# Patient Record
Sex: MALE | Race: WHITE | Employment: OTHER | URBAN - METROPOLITAN AREA
[De-identification: names, ages, dates, MRNs, and addresses within clinical notes are randomized per-mention and may not be internally consistent; named-entity substitution may affect disease eponyms.]

---

## 2021-08-03 ENCOUNTER — HOSPITAL ENCOUNTER (EMERGENCY)
Age: 86
Discharge: HOME | End: 2021-08-03
Attending: EMERGENCY MEDICINE
Payer: MEDICARE

## 2021-08-03 ENCOUNTER — APPOINTMENT (OUTPATIENT)
Dept: CT IMAGING | Age: 86
End: 2021-08-03
Payer: MEDICARE

## 2021-08-03 VITALS
OXYGEN SATURATION: 97 % | RESPIRATION RATE: 18 BRPM | SYSTOLIC BLOOD PRESSURE: 206 MMHG | WEIGHT: 155 LBS | TEMPERATURE: 97.9 F | HEART RATE: 82 BPM | DIASTOLIC BLOOD PRESSURE: 76 MMHG
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
## 2021-08-03 DIAGNOSIS — T14.8XXA MULTIPLE SKIN TEARS: ICD-10-CM

## 2021-08-03 DIAGNOSIS — S09.90XA CLOSED HEAD INJURY, INITIAL ENCOUNTER: ICD-10-CM

## 2021-08-03 DIAGNOSIS — S01.81XA FACIAL LACERATION, INITIAL ENCOUNTER: ICD-10-CM

## 2021-08-03 PROCEDURE — 72125 CT NECK SPINE W/O DYE: CPT

## 2021-08-03 PROCEDURE — 99284 EMERGENCY DEPT VISIT MOD MDM: CPT

## 2021-08-03 PROCEDURE — 12004 RPR S/N/AX/GEN/TRK7.6-12.5CM: CPT

## 2021-08-03 PROCEDURE — 70450 CT HEAD/BRAIN W/O DYE: CPT

## 2021-08-03 RX ORDER — ASPIRIN 81 MG/1
81 TABLET, CHEWABLE ORAL DAILY
COMMUNITY

## 2021-08-03 NOTE — ED NOTES
Skin abrasions on head, knee, and fingers cleaned with Hibiclens and saline. Pt tolerated well.       [facility]  08/03/21 1943

## 2021-08-03 NOTE — ED TRIAGE NOTES
Pt fell in kitchen. States hit head on floor. Hematoma and laceration to right forehead. Skin tears and wounds to right leg, right elbow, left hand. Denies LOC. Takes 81 mg ASA daily.

## 2021-08-04 PROBLEM — D64.9 ANEMIA: Status: ACTIVE | Noted: 2020-05-05

## 2021-08-04 PROBLEM — I50.9 CONGESTIVE HEART FAILURE (HCC): Status: ACTIVE | Noted: 2020-05-05

## 2021-08-04 PROBLEM — Z71.89 ADVANCE DIRECTIVE DISCUSSED WITH PATIENT: Status: ACTIVE | Noted: 2020-11-09

## 2021-08-04 PROBLEM — B02.9 HERPES ZOSTER: Status: ACTIVE | Noted: 2020-04-08

## 2021-08-04 PROBLEM — R73.01 IMPAIRED FASTING GLUCOSE: Status: ACTIVE | Noted: 2021-08-04

## 2021-08-04 PROBLEM — E78.5 HYPERLIPIDEMIA: Status: ACTIVE | Noted: 2021-08-04

## 2021-08-04 PROBLEM — F02.80 DEMENTIA DUE TO GENERAL MEDICAL CONDITION (HCC): Status: ACTIVE | Noted: 2021-08-04

## 2021-08-04 ASSESSMENT — ENCOUNTER SYMPTOMS
RHINORRHEA: 0
SHORTNESS OF BREATH: 0
ABDOMINAL PAIN: 0
COUGH: 0

## 2021-08-04 NOTE — ED PROVIDER NOTES
[address]      Pt Name: Ilia Rivera  MRN: [id number]  [place] 10/5/1926  Date of evaluation: 8/3/2021  Provider: Tu Silva MD    CHIEF COMPLAINT       Chief Complaint   Patient presents with    Fall    Laceration    Head Injury         HISTORY OF PRESENT ILLNESS   (Location/Symptom, Timing/Onset,Context/Setting, Quality, Duration, Modifying Factors, Severity)  Note limiting factors. Ilia Rivera is a 80 y.o. male who presents to the emergency department for a fall. The patient states that he had gone earlier in the day to receive his shots in his eyes for his macular degeneration. This evening he was walking to go to the refrigerator to get a drink he was not using his walker and he was having trouble seeing. As he reached for the refrigerator the. That he became unstable and fell. He tried grabbing onto something as he fell he landed hitting his head on the hardwood floor. He immediately called out to his son for help who came and found him laying on the floor. The patient denies any pain at this moment. He has multiple wounds. Nursing notes were reviewed. REVIEW OF SYSTEMS    (2-9 systems for level 4, 10 or more for level 5)     Review of Systems   Constitutional: Negative for fever. HENT: Negative for rhinorrhea. Eyes: Positive for visual disturbance. Respiratory: Negative for cough and shortness of breath. Cardiovascular: Negative for chest pain. Gastrointestinal: Negative for abdominal pain. Genitourinary: Negative for difficulty urinating. Musculoskeletal: Negative for neck pain. Neurological: Negative for headaches.          PAST MEDICAL HISTORY     Past Medical History:   Diagnosis Date    Macular degeneration     Seizures ([facility] [address]          SURGICALHISTORY       Past Surgical History:   Procedure Laterality Date    JOINT REPLACEMENT           CURRENT MEDICATIONS       Discharge Medication List as of 8/3/2021  8:33 PM      CONTINUE these medications which have NOT CHANGED    Details   aspirin 81 MG chewable tablet Take 81 mg by mouth dailyHistorical Med             ALLERGIES     Patient has no known allergies. FAMILY HISTORY     No family history on file. SOCIAL HISTORY       Social History     Socioeconomic History    Marital status:      Spouse name: Not on file    Number of children: Not on file    Years of education: Not on file    Highest education level: Not on file   Occupational History    Not on file   Tobacco Use    Smoking status: Unknown If Ever Smoked   Substance and Sexual Activity    Alcohol use: Yes    Drug use: Not on file    Sexual activity: Not on file   Other Topics Concern    Not on file   Social History Narrative    Not on file     Social Determinants of Health     Financial Resource Strain:     Difficulty of Paying Living Expenses:    Food Insecurity:     Worried About Running Out of Food in the Last Year:     [address] in the Last Year:    Transportation Needs:     Lack of Transportation (Medical):      Lack of Transportation (Non-Medical):    Physical Activity:     Days of Exercise per Week:     Minutes of Exercise per Session:    Stress:     Feeling of Stress :    Social Connections:     Frequency of Communication with Friends and Family:     Frequency of Social Gatherings with Friends and Family:     Attends Orthodox Services:     Active Member of Clubs or Organizations:     Attends Club or Organization Meetings:     Marital Status:    Intimate Partner Violence:     Fear of Current or Ex-Partner:     Emotionally Abused:     Physically Abused:     Sexually Abused:        SCREENINGS    Georgi Coma Scale  Eye Opening: Spontaneous  Best Verbal Response: Oriented  Best Motor Response: Obeys commands  Georgi Coma Scale Score: 15        PHYSICAL EXAM    (up to 7 for level 4, 8 or more for level 5)     ED Triage Vitals [08/03/21 1837]   BP Temp Temp Source Pulse Resp SpO2 Height Weight   (!) 193/68 97.9 °F (36.6 °C) Oral 57 16 100 % -- 155 lb (70.3 kg)       Physical Exam  Vitals and nursing note reviewed. Constitutional:       Appearance: Normal appearance. He is well-developed. He is not ill-appearing. HENT:      Head: Normocephalic and atraumatic. Right Ear: External ear normal.      Left Ear: External ear normal.      Nose: Nose normal.   Eyes:      General: No scleral icterus. Right eye: No discharge. Left eye: No discharge. Comments: Excessive tearing of bilateral eyes   Cardiovascular:      Rate and Rhythm: Normal rate and regular rhythm. Pulmonary:      Effort: Pulmonary effort is normal. No respiratory distress. Abdominal:      General: Bowel sounds are normal. There is no distension. Palpations: Abdomen is soft. Tenderness: There is no abdominal tenderness. Musculoskeletal:         General: No tenderness or deformity. Cervical back: Neck supple. No tenderness. Skin:     Coloration: Skin is not pale. Comments: There is an approximately 1-1/2 cm laceration over the right eyebrow with surrounding hematoma. There is also a skin tear in the lateral aspect of the right elbow measuring approximately 2-1/2 cm. There is a larger skin tear measuring approximately 6-1/2 x 5 cm on the right proximal leg and there are tears of the 3rd and 4th fingers on the left hand on the palmar aspect. Neurological:      Mental Status: He is alert.    Psychiatric:         Mood and Affect: Mood normal.         Behavior: Behavior normal.             DIAGNOSTIC RESULTS     EKG: All EKG's are interpreted by the Emergency Department Physician who either signs or Co-signs this chart in the absence of a cardiologist.    12 lead EKG shows     RADIOLOGY:   Non-plain film images such as CT, Ultrasound and MRI are read by the radiologist. Plain radiographic images are visualized and preliminarily interpreted by the emergency physician with the below findings:        Interpretation per the Radiologist below, if available at the time of this note:    CT CERVICAL SPINE WO CONTRAST   Final Result      1. No acute intracranial process. 2.  Chronic bilateral basal ganglia lacunar infarcts. 3.  No evidence of acute fracture in the cervical spine. Moderate cervical spondylosis. CT HEAD WO CONTRAST   Final Result      1. No acute intracranial process. 2.  Chronic bilateral basal ganglia lacunar infarcts. 3.  No evidence of acute fracture in the cervical spine. Moderate cervical spondylosis. ED BEDSIDE ULTRASOUND:   Performed by ED Physician - none    LABS:  Labs Reviewed - No data to display    All other labs were within normal range or not returned as of this dictation. EMERGENCY DEPARTMENT COURSE and DIFFERENTIAL DIAGNOSIS/MDM:   Vitals:    Vitals:    08/03/21 1837 08/03/21 2013   BP: (!) 193/68 (!) 206/76   Pulse: 57 82   Resp: 16 18   Temp: 97.9 °F (36.6 °C)    TempSrc: Oral    SpO2: 100% 97%   Weight: 155 lb (70.3 kg)        Elderly male who comes in after mechanical fall. A CT head and CT cervical spine obtained. Patient is not having any pain at present. Wound care is performed and the wounds are cleaned with hexedine solution. Laceration Repair Procedure Note    Indication: Multiple skin injuries as documented in physical exam    Procedure: The wounds are cleaned and irrigated with chlorhexidine solution and normal saline. On the right side of the face Steri-Strip was used for approximation. On the right elbow a combination of Dermabond and Steri-Strips were used. On the right leg Steri-Strips were used for approximation and on the left hand Steri-Strips and Dermabond were used for approximation. No active bleeding no complications the patient tolerates the procedure well. The wounds are covered in a nonadherent sterile dressing. No foreign body were identified.   Total repaired wound length 11cm    Instructions given on wound care. Follow-up in the primary care setting recommended. Return instructions discussed with the patient and his son. They expressed understanding and they are agreeable. The patient is able to ambulate here in the emergency room with minimal assistance which is at his baseline. CRITICAL CARE TIME   None       CONSULTS:  None    PROCEDURES:       Procedures    FINAL IMPRESSION      1. Fall, initial encounter    2. Closed head injury, initial encounter    3. Multiple skin tears    4. Facial laceration, initial encounter          DISPOSITION/PLAN   DISPOSITION Decision To Discharge 08/03/2021 08:08:26 PM      PATIENT REFERREDTO:  No follow-up provider specified.     DISCHARGEMEDICATIONS:  Discharge Medication List as of 8/3/2021  8:33 PM             (Please note that portions of this note were completed with a voice recognition program.  Efforts were made to edit the dictations but occasionally words are mis-transcribed.)    Sarah Sahni MD (electronically signed)  Attending Emergency Physician       Sarah Sahni MD  08/04/21 4424

## 2021-08-04 NOTE — ED NOTES
Placed non adherent gauze and stretch gauze on elbow and knee skin tear. CMS intact pre and post wrapping. Pt. Tolerated well.       Perfecto Laureano RCP  08/03/21 2021

## 2023-07-27 ENCOUNTER — HOSPITAL ENCOUNTER (INPATIENT)
Age: 88
LOS: 5 days | Discharge: NURSING, REHAB OR LONG-TERM CARE | DRG: 872 | End: 2023-08-01
Attending: EMERGENCY MEDICINE | Admitting: STUDENT IN AN ORGANIZED HEALTH CARE EDUCATION/TRAINING PROGRAM
Payer: MEDICARE

## 2023-07-27 DIAGNOSIS — L03.116 CELLULITIS OF LEFT LOWER EXTREMITY: Primary | ICD-10-CM

## 2023-07-27 PROBLEM — L03.90 CELLULITIS: Status: ACTIVE | Noted: 2023-07-27

## 2023-07-27 PROBLEM — L03.90 CELLULITIS: Status: RESOLVED | Noted: 2023-07-27 | Resolved: 2023-07-27

## 2023-07-27 PROBLEM — N18.4 CKD (CHRONIC KIDNEY DISEASE) STAGE 4, GFR 15-29 ML/MIN (HCC): Status: ACTIVE | Noted: 2023-07-27

## 2023-07-27 LAB
ALBUMIN SERPL-MCNC: 3.4 G/DL (ref 3.4–5)
ALBUMIN/GLOB SERPL: 1.2 {RATIO} (ref 1.1–2.2)
ALP SERPL-CCNC: 172 U/L (ref 40–129)
ALT SERPL-CCNC: 16 U/L (ref 10–40)
ANION GAP SERPL CALCULATED.3IONS-SCNC: 12 MMOL/L (ref 3–16)
AST SERPL-CCNC: 34 U/L (ref 15–37)
BASOPHILS # BLD: 0 K/UL (ref 0–0.2)
BASOPHILS NFR BLD: 0.1 %
BILIRUB SERPL-MCNC: 0.7 MG/DL (ref 0–1)
BUN SERPL-MCNC: 59 MG/DL (ref 7–20)
CALCIUM SERPL-MCNC: 8.3 MG/DL (ref 8.3–10.6)
CHLORIDE SERPL-SCNC: 101 MMOL/L (ref 99–110)
CO2 SERPL-SCNC: 24 MMOL/L (ref 21–32)
CREAT SERPL-MCNC: 1.7 MG/DL (ref 0.8–1.3)
D DIMER: 1.92 UG/ML FEU (ref 0–0.6)
DEPRECATED RDW RBC AUTO: 15.2 % (ref 12.4–15.4)
EOSINOPHIL # BLD: 0 K/UL (ref 0–0.6)
EOSINOPHIL NFR BLD: 0 %
GFR SERPLBLD CREATININE-BSD FMLA CKD-EPI: 36 ML/MIN/{1.73_M2}
GLUCOSE SERPL-MCNC: 167 MG/DL (ref 70–99)
HCT VFR BLD AUTO: 30.5 % (ref 40.5–52.5)
HGB BLD-MCNC: 9.9 G/DL (ref 13.5–17.5)
LACTATE BLDV-SCNC: 1.3 MMOL/L (ref 0.4–2)
LYMPHOCYTES # BLD: 3.6 K/UL (ref 1–5.1)
LYMPHOCYTES NFR BLD: 28.2 %
MCH RBC QN AUTO: 28.6 PG (ref 26–34)
MCHC RBC AUTO-ENTMCNC: 32.4 G/DL (ref 31–36)
MCV RBC AUTO: 88.2 FL (ref 80–100)
MONOCYTES # BLD: 0.8 K/UL (ref 0–1.3)
MONOCYTES NFR BLD: 6.2 %
NEUTROPHILS # BLD: 8.3 K/UL (ref 1.7–7.7)
NEUTROPHILS NFR BLD: 65.5 %
PLATELET # BLD AUTO: 220 K/UL (ref 135–450)
PMV BLD AUTO: 8.5 FL (ref 5–10.5)
POTASSIUM SERPL-SCNC: 5.1 MMOL/L (ref 3.5–5.1)
PROT SERPL-MCNC: 6.3 G/DL (ref 6.4–8.2)
RBC # BLD AUTO: 3.45 M/UL (ref 4.2–5.9)
SODIUM SERPL-SCNC: 137 MMOL/L (ref 136–145)
WBC # BLD AUTO: 12.6 K/UL (ref 4–11)

## 2023-07-27 PROCEDURE — 87040 BLOOD CULTURE FOR BACTERIA: CPT

## 2023-07-27 PROCEDURE — 80053 COMPREHEN METABOLIC PANEL: CPT

## 2023-07-27 PROCEDURE — 6370000000 HC RX 637 (ALT 250 FOR IP): Performed by: EMERGENCY MEDICINE

## 2023-07-27 PROCEDURE — 6360000002 HC RX W HCPCS: Performed by: EMERGENCY MEDICINE

## 2023-07-27 PROCEDURE — 2580000003 HC RX 258: Performed by: INTERNAL MEDICINE

## 2023-07-27 PROCEDURE — 1200000000 HC SEMI PRIVATE

## 2023-07-27 PROCEDURE — 85379 FIBRIN DEGRADATION QUANT: CPT

## 2023-07-27 PROCEDURE — 99285 EMERGENCY DEPT VISIT HI MDM: CPT

## 2023-07-27 PROCEDURE — 96365 THER/PROPH/DIAG IV INF INIT: CPT

## 2023-07-27 PROCEDURE — 83605 ASSAY OF LACTIC ACID: CPT

## 2023-07-27 PROCEDURE — 85025 COMPLETE CBC W/AUTO DIFF WBC: CPT

## 2023-07-27 PROCEDURE — 6370000000 HC RX 637 (ALT 250 FOR IP): Performed by: INTERNAL MEDICINE

## 2023-07-27 PROCEDURE — 2580000003 HC RX 258: Performed by: EMERGENCY MEDICINE

## 2023-07-27 RX ORDER — SODIUM CHLORIDE 9 MG/ML
INJECTION, SOLUTION INTRAVENOUS PRN
Status: DISCONTINUED | OUTPATIENT
Start: 2023-07-27 | End: 2023-08-01 | Stop reason: HOSPADM

## 2023-07-27 RX ORDER — ACETAMINOPHEN 650 MG/1
650 SUPPOSITORY RECTAL EVERY 6 HOURS PRN
Status: DISCONTINUED | OUTPATIENT
Start: 2023-07-27 | End: 2023-08-01 | Stop reason: HOSPADM

## 2023-07-27 RX ORDER — TORSEMIDE 20 MG/1
1 TABLET ORAL DAILY
Status: ON HOLD | COMMUNITY
End: 2023-08-01 | Stop reason: HOSPADM

## 2023-07-27 RX ORDER — LATANOPROST 50 UG/ML
1 SOLUTION/ DROPS OPHTHALMIC NIGHTLY
Status: DISCONTINUED | OUTPATIENT
Start: 2023-07-27 | End: 2023-08-01 | Stop reason: HOSPADM

## 2023-07-27 RX ORDER — TORSEMIDE 20 MG/1
20 TABLET ORAL DAILY
Status: DISCONTINUED | OUTPATIENT
Start: 2023-07-28 | End: 2023-07-27

## 2023-07-27 RX ORDER — FUROSEMIDE 40 MG/1
40 TABLET ORAL 2 TIMES DAILY
Status: ON HOLD | COMMUNITY
Start: 2023-07-21 | End: 2023-08-01 | Stop reason: HOSPADM

## 2023-07-27 RX ORDER — DOXYCYCLINE 100 MG/1
100 CAPSULE ORAL ONCE
Status: COMPLETED | OUTPATIENT
Start: 2023-07-27 | End: 2023-07-27

## 2023-07-27 RX ORDER — ASPIRIN 81 MG/1
81 TABLET, CHEWABLE ORAL DAILY
Status: DISCONTINUED | OUTPATIENT
Start: 2023-07-28 | End: 2023-08-01 | Stop reason: HOSPADM

## 2023-07-27 RX ORDER — ENOXAPARIN SODIUM 100 MG/ML
30 INJECTION SUBCUTANEOUS DAILY
Status: DISCONTINUED | OUTPATIENT
Start: 2023-07-28 | End: 2023-07-28

## 2023-07-27 RX ORDER — ONDANSETRON 4 MG/1
4 TABLET, ORALLY DISINTEGRATING ORAL EVERY 8 HOURS PRN
Status: DISCONTINUED | OUTPATIENT
Start: 2023-07-27 | End: 2023-08-01 | Stop reason: HOSPADM

## 2023-07-27 RX ORDER — SODIUM CHLORIDE 0.9 % (FLUSH) 0.9 %
5-40 SYRINGE (ML) INJECTION EVERY 12 HOURS SCHEDULED
Status: DISCONTINUED | OUTPATIENT
Start: 2023-07-27 | End: 2023-08-01 | Stop reason: HOSPADM

## 2023-07-27 RX ORDER — DORZOLAMIDE HCL 20 MG/ML
1 SOLUTION/ DROPS OPHTHALMIC EVERY 12 HOURS
COMMUNITY
Start: 2018-12-17

## 2023-07-27 RX ORDER — LATANOPROST 50 UG/ML
SOLUTION/ DROPS OPHTHALMIC
COMMUNITY
Start: 2021-03-18

## 2023-07-27 RX ORDER — ONDANSETRON 2 MG/ML
4 INJECTION INTRAMUSCULAR; INTRAVENOUS EVERY 6 HOURS PRN
Status: DISCONTINUED | OUTPATIENT
Start: 2023-07-27 | End: 2023-08-01 | Stop reason: HOSPADM

## 2023-07-27 RX ORDER — LEVETIRACETAM 500 MG/1
TABLET ORAL
COMMUNITY
Start: 2023-07-16

## 2023-07-27 RX ORDER — GABAPENTIN 100 MG/1
100 CAPSULE ORAL 3 TIMES DAILY
Status: DISCONTINUED | OUTPATIENT
Start: 2023-07-27 | End: 2023-07-27

## 2023-07-27 RX ORDER — LISINOPRIL 5 MG/1
5 TABLET ORAL 2 TIMES DAILY
COMMUNITY
Start: 2023-07-19

## 2023-07-27 RX ORDER — POLYETHYLENE GLYCOL 3350 17 G/17G
17 POWDER, FOR SOLUTION ORAL DAILY PRN
Status: DISCONTINUED | OUTPATIENT
Start: 2023-07-27 | End: 2023-08-01 | Stop reason: HOSPADM

## 2023-07-27 RX ORDER — LISINOPRIL 5 MG/1
5 TABLET ORAL 2 TIMES DAILY
Status: DISCONTINUED | OUTPATIENT
Start: 2023-07-27 | End: 2023-08-01 | Stop reason: HOSPADM

## 2023-07-27 RX ORDER — GABAPENTIN 100 MG/1
1 CAPSULE ORAL 3 TIMES DAILY
Status: ON HOLD | COMMUNITY
End: 2023-08-01 | Stop reason: HOSPADM

## 2023-07-27 RX ORDER — LEVETIRACETAM 500 MG/1
250 TABLET ORAL 2 TIMES DAILY
Status: DISCONTINUED | OUTPATIENT
Start: 2023-07-27 | End: 2023-08-01 | Stop reason: HOSPADM

## 2023-07-27 RX ORDER — SODIUM CHLORIDE 0.9 % (FLUSH) 0.9 %
5-40 SYRINGE (ML) INJECTION PRN
Status: DISCONTINUED | OUTPATIENT
Start: 2023-07-27 | End: 2023-08-01 | Stop reason: HOSPADM

## 2023-07-27 RX ORDER — ACETAMINOPHEN 325 MG/1
650 TABLET ORAL EVERY 6 HOURS PRN
Status: DISCONTINUED | OUTPATIENT
Start: 2023-07-27 | End: 2023-08-01 | Stop reason: HOSPADM

## 2023-07-27 RX ADMIN — PIPERACILLIN AND TAZOBACTAM 3375 MG: 3; .375 INJECTION, POWDER, LYOPHILIZED, FOR SOLUTION INTRAVENOUS at 20:00

## 2023-07-27 RX ADMIN — LISINOPRIL 5 MG: 5 TABLET ORAL at 23:43

## 2023-07-27 RX ADMIN — DOXYCYCLINE 100 MG: 100 CAPSULE ORAL at 19:12

## 2023-07-27 RX ADMIN — LEVETIRACETAM 250 MG: 500 TABLET, FILM COATED ORAL at 23:42

## 2023-07-27 RX ADMIN — SODIUM CHLORIDE, PRESERVATIVE FREE 10 ML: 5 INJECTION INTRAVENOUS at 23:42

## 2023-07-27 ASSESSMENT — PAIN SCALES - GENERAL
PAINLEVEL_OUTOF10: 0
PAINLEVEL_OUTOF10: 0

## 2023-07-27 ASSESSMENT — ENCOUNTER SYMPTOMS
TROUBLE SWALLOWING: 0
VOICE CHANGE: 0
SHORTNESS OF BREATH: 0
WHEEZING: 0

## 2023-07-27 ASSESSMENT — PAIN - FUNCTIONAL ASSESSMENT: PAIN_FUNCTIONAL_ASSESSMENT: NONE - DENIES PAIN

## 2023-07-27 NOTE — PLAN OF CARE
[facility] Hospitalist Transfer accept note  Case reviewed with ER physician Dr Rachelle Amador    80year-old male with history of Parkinson's, seizure, HTN, CKD stage IV(baseline creatinine high 1, although has been in the twos in the past) who presented from nursing home with 2-day history of left lower extremity redness swelling pain. In the ER vitals were stable, labs showed baseline creatinine, leukocytosis 12.6, anemia of 9.9 at baseline. Blood cultures were obtained, due to his lower extremity swelling D-dimer was obtained. Patient will be admitted to [address] [place] [address] telemetry with need for IV antibiotics, patient was started on Zosyn, doxycycline in the ER will need MRSA swab, possible lower extremity Dopplers. PCP:  Nick Hinton MD      Admitting orders already given to Transfer center     Please notify MD once the patient arrives.     Electronically signed by Kem Marquez MD on 7/27/23 at 6:32 PM EDT  [address] \[facility]\"" hospitalist.

## 2023-07-27 NOTE — ED NOTES
Patient to transfer to room # [phone] report to [facility] patient and family updated, eta 40 mins     Sallee Essex, RN  07/27/23 5161

## 2023-07-28 ENCOUNTER — APPOINTMENT (OUTPATIENT)
Dept: CT IMAGING | Age: 88
DRG: 872 | End: 2023-07-28
Payer: MEDICARE

## 2023-07-28 ENCOUNTER — APPOINTMENT (OUTPATIENT)
Dept: VASCULAR LAB | Age: 88
DRG: 872 | End: 2023-07-28
Payer: MEDICARE

## 2023-07-28 LAB
25(OH)D3 SERPL-MCNC: 30.8 NG/ML
ANION GAP SERPL CALCULATED.3IONS-SCNC: 11 MMOL/L (ref 3–16)
ANTI-XA UNFRAC HEPARIN: 0.15 IU/ML (ref 0.3–0.7)
ANTI-XA UNFRAC HEPARIN: <0.1 IU/ML (ref 0.3–0.7)
APTT BLD: 121.5 SEC (ref 22.7–35.9)
BACTERIA URNS QL MICRO: ABNORMAL /HPF
BASOPHILS # BLD: 0 K/UL (ref 0–0.2)
BASOPHILS NFR BLD: 0.2 %
BILIRUB UR QL STRIP.AUTO: NEGATIVE
BUN SERPL-MCNC: 65 MG/DL (ref 7–20)
CALCIUM SERPL-MCNC: 7.7 MG/DL (ref 8.3–10.6)
CHLORIDE SERPL-SCNC: 106 MMOL/L (ref 99–110)
CLARITY UR: CLEAR
CO2 SERPL-SCNC: 23 MMOL/L (ref 21–32)
COLOR UR: YELLOW
CREAT SERPL-MCNC: 2.1 MG/DL (ref 0.8–1.3)
CRP SERPL-MCNC: 141.5 MG/L (ref 0–5.1)
CRYSTALS URNS MICRO: ABNORMAL /HPF
DEPRECATED RDW RBC AUTO: 15.5 % (ref 12.4–15.4)
EOSINOPHIL # BLD: 0 K/UL (ref 0–0.6)
EOSINOPHIL NFR BLD: 0.1 %
EPI CELLS #/AREA URNS HPF: ABNORMAL /HPF (ref 0–5)
GFR SERPLBLD CREATININE-BSD FMLA CKD-EPI: 28 ML/MIN/{1.73_M2}
GLUCOSE SERPL-MCNC: 162 MG/DL (ref 70–99)
GLUCOSE UR STRIP.AUTO-MCNC: NEGATIVE MG/DL
HCT VFR BLD AUTO: 28.4 % (ref 40.5–52.5)
HGB BLD-MCNC: 9.2 G/DL (ref 13.5–17.5)
HGB UR QL STRIP.AUTO: ABNORMAL
INR PPP: 1.55 (ref 0.84–1.16)
IRON SATN MFR SERPL: 6 % (ref 20–50)
IRON SERPL-MCNC: 12 UG/DL (ref 59–158)
KETONES UR STRIP.AUTO-MCNC: NEGATIVE MG/DL
LEUKOCYTE ESTERASE UR QL STRIP.AUTO: ABNORMAL
LYMPHOCYTES # BLD: 3.4 K/UL (ref 1–5.1)
LYMPHOCYTES NFR BLD: 30 %
MCH RBC QN AUTO: 28.7 PG (ref 26–34)
MCHC RBC AUTO-ENTMCNC: 32.3 G/DL (ref 31–36)
MCV RBC AUTO: 88.8 FL (ref 80–100)
MONOCYTES # BLD: 0.7 K/UL (ref 0–1.3)
MONOCYTES NFR BLD: 6.4 %
NEUTROPHILS # BLD: 7.2 K/UL (ref 1.7–7.7)
NEUTROPHILS NFR BLD: 63.3 %
NITRITE UR QL STRIP.AUTO: POSITIVE
PH UR STRIP.AUTO: 6 [PH] (ref 5–8)
PLATELET # BLD AUTO: 206 K/UL (ref 135–450)
PMV BLD AUTO: 8.8 FL (ref 5–10.5)
POTASSIUM SERPL-SCNC: 4.9 MMOL/L (ref 3.5–5.1)
PROT UR STRIP.AUTO-MCNC: 100 MG/DL
PROTHROMBIN TIME: 18.5 SEC (ref 11.5–14.8)
RBC # BLD AUTO: 3.2 M/UL (ref 4.2–5.9)
RBC #/AREA URNS HPF: ABNORMAL /HPF (ref 0–4)
SODIUM SERPL-SCNC: 140 MMOL/L (ref 136–145)
SP GR UR STRIP.AUTO: 1.02 (ref 1–1.03)
TIBC SERPL-MCNC: 210 UG/DL (ref 260–445)
UA COMPLETE W REFLEX CULTURE PNL UR: YES
UA DIPSTICK W REFLEX MICRO PNL UR: YES
URN SPEC COLLECT METH UR: ABNORMAL
UROBILINOGEN UR STRIP-ACNC: 0.2 E.U./DL
VIT B12 SERPL-MCNC: 1953 PG/ML (ref 211–911)
WBC # BLD AUTO: 11.4 K/UL (ref 4–11)
WBC #/AREA URNS HPF: >100 /HPF (ref 0–5)

## 2023-07-28 PROCEDURE — 6370000000 HC RX 637 (ALT 250 FOR IP): Performed by: INTERNAL MEDICINE

## 2023-07-28 PROCEDURE — 85520 HEPARIN ASSAY: CPT

## 2023-07-28 PROCEDURE — 86140 C-REACTIVE PROTEIN: CPT

## 2023-07-28 PROCEDURE — 85025 COMPLETE CBC W/AUTO DIFF WBC: CPT

## 2023-07-28 PROCEDURE — 85652 RBC SED RATE AUTOMATED: CPT

## 2023-07-28 PROCEDURE — 1200000000 HC SEMI PRIVATE

## 2023-07-28 PROCEDURE — 36415 COLL VENOUS BLD VENIPUNCTURE: CPT

## 2023-07-28 PROCEDURE — 82607 VITAMIN B-12: CPT

## 2023-07-28 PROCEDURE — 6360000002 HC RX W HCPCS: Performed by: INTERNAL MEDICINE

## 2023-07-28 PROCEDURE — 84134 ASSAY OF PREALBUMIN: CPT

## 2023-07-28 PROCEDURE — 97530 THERAPEUTIC ACTIVITIES: CPT

## 2023-07-28 PROCEDURE — 97166 OT EVAL MOD COMPLEX 45 MIN: CPT

## 2023-07-28 PROCEDURE — 85730 THROMBOPLASTIN TIME PARTIAL: CPT

## 2023-07-28 PROCEDURE — 87077 CULTURE AEROBIC IDENTIFY: CPT

## 2023-07-28 PROCEDURE — 87086 URINE CULTURE/COLONY COUNT: CPT

## 2023-07-28 PROCEDURE — 85610 PROTHROMBIN TIME: CPT

## 2023-07-28 PROCEDURE — 93971 EXTREMITY STUDY: CPT

## 2023-07-28 PROCEDURE — 82306 VITAMIN D 25 HYDROXY: CPT

## 2023-07-28 PROCEDURE — 2580000003 HC RX 258: Performed by: INTERNAL MEDICINE

## 2023-07-28 PROCEDURE — 87186 SC STD MICRODIL/AGAR DIL: CPT

## 2023-07-28 PROCEDURE — 97162 PT EVAL MOD COMPLEX 30 MIN: CPT

## 2023-07-28 PROCEDURE — 83540 ASSAY OF IRON: CPT

## 2023-07-28 PROCEDURE — 73700 CT LOWER EXTREMITY W/O DYE: CPT

## 2023-07-28 PROCEDURE — 83550 IRON BINDING TEST: CPT

## 2023-07-28 PROCEDURE — 80048 BASIC METABOLIC PNL TOTAL CA: CPT

## 2023-07-28 PROCEDURE — 81001 URINALYSIS AUTO W/SCOPE: CPT

## 2023-07-28 RX ORDER — THIAMINE HYDROCHLORIDE 100 MG/ML
100 INJECTION, SOLUTION INTRAMUSCULAR; INTRAVENOUS DAILY
Status: DISCONTINUED | OUTPATIENT
Start: 2023-07-28 | End: 2023-07-30

## 2023-07-28 RX ORDER — HEPARIN SODIUM 1000 [USP'U]/ML
80 INJECTION, SOLUTION INTRAVENOUS; SUBCUTANEOUS ONCE
Status: COMPLETED | OUTPATIENT
Start: 2023-07-28 | End: 2023-07-28

## 2023-07-28 RX ORDER — HEPARIN SODIUM 1000 [USP'U]/ML
40 INJECTION, SOLUTION INTRAVENOUS; SUBCUTANEOUS PRN
Status: DISCONTINUED | OUTPATIENT
Start: 2023-07-28 | End: 2023-07-30

## 2023-07-28 RX ORDER — HEPARIN SODIUM 10000 [USP'U]/100ML
5-30 INJECTION, SOLUTION INTRAVENOUS CONTINUOUS
Status: DISCONTINUED | OUTPATIENT
Start: 2023-07-28 | End: 2023-07-30

## 2023-07-28 RX ORDER — LANOLIN ALCOHOL/MO/W.PET/CERES
3 CREAM (GRAM) TOPICAL NIGHTLY
Status: DISCONTINUED | OUTPATIENT
Start: 2023-07-28 | End: 2023-08-01 | Stop reason: HOSPADM

## 2023-07-28 RX ORDER — HEPARIN SODIUM 1000 [USP'U]/ML
80 INJECTION, SOLUTION INTRAVENOUS; SUBCUTANEOUS PRN
Status: DISCONTINUED | OUTPATIENT
Start: 2023-07-28 | End: 2023-07-30

## 2023-07-28 RX ADMIN — HEPARIN SODIUM 24 UNITS/KG/HR: 10000 INJECTION, SOLUTION INTRAVENOUS at 22:14

## 2023-07-28 RX ADMIN — Medication 3 MG: at 20:39

## 2023-07-28 RX ADMIN — CEFAZOLIN 1000 MG: 1 INJECTION, POWDER, FOR SOLUTION INTRAMUSCULAR; INTRAVENOUS at 09:46

## 2023-07-28 RX ADMIN — HEPARIN SODIUM 5550 UNITS: 1000 INJECTION INTRAVENOUS; SUBCUTANEOUS at 03:37

## 2023-07-28 RX ADMIN — LISINOPRIL 5 MG: 5 TABLET ORAL at 09:25

## 2023-07-28 RX ADMIN — CEFAZOLIN 1000 MG: 1 INJECTION, POWDER, FOR SOLUTION INTRAMUSCULAR; INTRAVENOUS at 00:11

## 2023-07-28 RX ADMIN — LATANOPROST 1 DROP: 50 SOLUTION OPHTHALMIC at 00:10

## 2023-07-28 RX ADMIN — HEPARIN SODIUM 18 UNITS/KG/HR: 10000 INJECTION, SOLUTION INTRAVENOUS at 03:43

## 2023-07-28 RX ADMIN — ASPIRIN 81 MG 81 MG: 81 TABLET ORAL at 09:25

## 2023-07-28 RX ADMIN — THIAMINE HYDROCHLORIDE 100 MG: 100 INJECTION, SOLUTION INTRAMUSCULAR; INTRAVENOUS at 18:22

## 2023-07-28 RX ADMIN — LEVETIRACETAM 250 MG: 500 TABLET, FILM COATED ORAL at 20:39

## 2023-07-28 RX ADMIN — LEVETIRACETAM 250 MG: 500 TABLET, FILM COATED ORAL at 09:25

## 2023-07-28 RX ADMIN — HEPARIN SODIUM 5550 UNITS: 1000 INJECTION INTRAVENOUS; SUBCUTANEOUS at 09:28

## 2023-07-28 RX ADMIN — VANCOMYCIN HYDROCHLORIDE 1750 MG: 10 INJECTION, POWDER, LYOPHILIZED, FOR SOLUTION INTRAVENOUS at 18:39

## 2023-07-28 RX ADMIN — HEPARIN SODIUM 2780 UNITS: 1000 INJECTION INTRAVENOUS; SUBCUTANEOUS at 18:11

## 2023-07-28 RX ADMIN — SODIUM CHLORIDE, PRESERVATIVE FREE 10 ML: 5 INJECTION INTRAVENOUS at 20:41

## 2023-07-28 RX ADMIN — CEFAZOLIN 1000 MG: 1 INJECTION, POWDER, FOR SOLUTION INTRAMUSCULAR; INTRAVENOUS at 22:16

## 2023-07-28 RX ADMIN — SODIUM CHLORIDE: 9 INJECTION, SOLUTION INTRAVENOUS at 09:45

## 2023-07-28 RX ADMIN — LATANOPROST 1 DROP: 50 SOLUTION OPHTHALMIC at 20:39

## 2023-07-28 ASSESSMENT — ENCOUNTER SYMPTOMS
SHORTNESS OF BREATH: 0
COLOR CHANGE: 1

## 2023-07-28 NOTE — PROGRESS NOTES
Assumed care at 0300, Pt hypertensive, MD made aware. Had x1 BM, used urinal for voiding and some incontinence. Heparin gtt started. Pt's aPTT was critically elevated, MD made aware, lab draw was informed to redraw blood since heparin gtt was infusing during draw. Will monitor. Fall precaution in place, bed alarm on, bed locked and at lowest position. Call light was used for needs.

## 2023-07-28 NOTE — PROGRESS NOTES
/ Impairments: Decreased functional mobility ; Decreased endurance;Decreased coordination;Decreased ADL status; Decreased balance;Decreased strength;Decreased high-level IADLs  Assessment: Prior to admission pt was living at home with his wife, was independent with ADLs and IADLs. Pt now presents slightly below his baseline, demonstrating CGA for mobiltiy and transfers. Pt plans to dc home to ILU with increased assist from aide services. Would also benefit from [address] at [place] as well. Continue OT POC. Treatment Diagnosis: decreased ADLs and transfers secondary to cellulitis  Decision Making: Medium Complexity  REQUIRES OT FOLLOW-UP: Yes  Activity Tolerance  Activity Tolerance: Patient Tolerated treatment well  Activity Tolerance Comments: Pt demonstrated min fatigue after bed to chair transfer. Declined further mobility 2/2 wanting to eat lunch        Plan   Occupational Therapy Plan  Times Per Week: 2-5  Current Treatment Recommendations: Coordination training, Self-Care / ADL, Safety education & training, Endurance training, Patient/Caregiver education & training     Restrictions  Position Activity Restriction  Other position/activity restrictions: up as tolerated    Subjective   General  Chart Reviewed: Yes  Additional Pertinent Hx: Pt admitted to ED with c/o R LE redness and swelling. Diagnosed with cellulitis. PMHx includes: hypertension, CKD 4, seizure disorder, dementia. Family / Caregiver Present: Yes (wife and daughter)  Referring Practitioner: Juan Antonio Miller MD  Diagnosis: cellulitis of L LE  Subjective  Subjective: Pt semi supine in bed upon arrival, agreeable to OT eval and treat. Pt requesting to get up to the chair for lunch. Denied pain, but noted L LE red and swollen.      Social/Functional History  Social/Functional History  Type of Home: Facility  Home Layout: One level  Home Access: Level entry  Bathroom Shower/Tub: Walk-in shower  Bathroom Toilet: Handicap height  Bathroom Equipment: Grab bars in shower, Grab bars around toilet, Shower chair  Home Equipment: Rollator, Alert Button  Has the patient had two or more falls in the past year or any fall with injury in the past year?: Yes (2 falls in 2 months)  Receives Help From: Personal care attendant (1hr/day aid)  ADL Assistance:  (aid assist daily for bathing/dressing)  Homemaking Assistance:  (ILF provides meals, cleaning)  Ambulation Assistance: Independent (using rollator)  Transfer Assistance: Independent  Active : No  Patient's  Info: sons and daughter       Objective            Safety Devices  Type of Devices: Nurse notified;Gait belt;Left in chair;Chair alarm in place;Call light within reach  Balance  Sitting: Intact  Standing: With support (CGA to SBA with RW)        ADL  Feeding: Beverage management;Setup  Grooming Skilled Clinical Factors: declined  Toileting Skilled Clinical Factors: denied urge     Activity Tolerance  Activity Tolerance: Patient tolerated evaluation without incident  Activity Tolerance Comments: limited by participation     Transfers  Sit to stand: Contact guard assistance  Stand to sit: Contact guard assistance  Transfer Comments: Stand pivot from bed to chair with RW with CGA. Pt declined further mobility 2/2 wanting to eat lunch. Vision  Vision: Impaired  Hearing  Hearing: Within functional limits  Cognition  Overall Cognitive Status: WFL  Orientation  Orientation Level: Oriented X4                  Education Given To: Patient  Education Provided: Role of Therapy;Plan of Care  Education Method: Verbal  Barriers to Learning: None  Education Outcome: Verbalized understanding  LUE AROM (degrees)  LUE AROM : WFL  RUE AROM (degrees)  RUE AROM : WFL      Treatment included functional transfer training, ADLs, and patient education.                               AM-PAC Score        AM-PAC Inpatient Daily Activity Raw Score: 18 (07/28/23 1509)  AM-PAC Inpatient ADL T-Scale Score : 38.66 (07/28/23 1509)  ADL Inpatient CMS 0-100%

## 2023-07-28 NOTE — PROGRESS NOTES
Called and left voicemail for pts daughter Jessica to go over home meds. Pt lives at [facility] and states he did not get any meds today.     Electronically signed by Kolton Rubio RN on 7/27/23 at 10:17 PM EDT

## 2023-07-28 NOTE — PROGRESS NOTES
Pharmacy Note - Renal Dosing    Cefazolin ordered for patient. This medication is renally eliminated. Will change to 1000mg q12h per renal dose adjustment policy. Estimated Creatinine Clearance: 25 mL/min (A) (based on SCr of 1.7 mg/dL (H)). Pharmacy will continue to monitor renal function and adjust dose as necessary. Please call with any questions.

## 2023-07-28 NOTE — ED NOTES
[facility] arrived for transportation to [facility]. Report given and all questions answered at this time. Pt is alert and oriented. Respirations appear even and unlabored. No acute distress noted at this time. Family at bedside. Pt placed on stretcher for transport and take from facility.       Missy Magaña RN  07/27/23 2006

## 2023-07-28 NOTE — CONSULTS
[facility] Wound Ostomy Continence Nurse  Consult Note       NAME:  Thomas Gillis  MEDICAL RECORD NUMBER:  [id number]  AGE: 80 y.o. GENDER: male  : 10/5/1926  TODAY'S DATE:  2023    Subjective   Reason for WOCN Evaluation and Assessment: Coccyx - Stage 3  L Lower Leg - cellulitis, skin tear      Thomas Gillis is a 80 y.o. male referred by:   [] Physician  [x] Nursing  [] Other:     Wound Identification:  Wound Type: pressure and skin tear  Contributing Factors: chronic pressure, decreased mobility, shear force, and CKD Stage 4, Parkinson's, ambulatory dysfunction    Wound History: Thomas Gillis is a 80 y.o. male poor historian with pmh of hypertension, CKD 4, seizure disorder, dementia  who presents with left leg swelling and redness. Patient had a nonsyncopal, ground-level fall 4 days ago. He does not remember the circumstances around the fall, however but does recall falling backwards. Then 2 days ago he noticed increased left lower extremity edema, redness and pain which started in the heel and progressed upwards to the knee interfering with his ambulation. He denies any fevers, chills, shortness of breath or chest pain. Emergency room his temperature 98.6, blood pressure 159/61, pulse 71, respirations 16, sats 96% on room air.     Current Wound Care Treatment:  Coccyx - foam    Patient Goal of Care:  [x] Wound Healing  [] Odor Control  [] Palliative Care  [] Pain Control   [] Other:         PAST MEDICAL HISTORY        Diagnosis Date    Ambulatory dysfunction     BPH (benign prostatic hyperplasia)     CKD (chronic kidney disease) stage 4, GFR 15-29 ml/min (HCC)     CVA (cerebral vascular accident) ([address])     Diastolic heart failure (HCC)     GERD (gastroesophageal reflux disease)     Glaucoma     Hypercholesterolemia     Hypertension     Macular degeneration     Osteoarthritis of multiple joints     Parkinson's disease ([address])     Peptic ulcer disease     Seizures ([address])     Senile dementia of Serosanguinous 07/28/23 0000   Number of days: 0       Wound 07/27/23 Coccyx (Active)   Wound Image   07/28/23 0900   Wound Etiology Pressure Stage 3 07/28/23 0900   Dressing Status Reinforced dressing 07/28/23 0900   Wound Cleansed Not Cleansed 07/28/23 0900   Dressing/Treatment Alginate with Ag; Foam 07/28/23 0900   Dressing Change Due 07/28/23 07/28/23 0900   Wound Length (cm) 1.5 cm 07/28/23 0900   Wound Width (cm) 1.7 cm 07/28/23 0900   Wound Depth (cm) 0.1 cm 07/28/23 0900   Wound Surface Area (cm^2) 2.55 cm^2 07/28/23 0900   Wound Volume (cm^3) 0.255 cm^3 07/28/23 0900   Wound Assessment Pink/red;Slough 07/28/23 0900   Drainage Amount Scant 07/28/23 0900   Drainage Description Serous 07/28/23 0900   Odor None 07/28/23 0900   Sis-wound Assessment Blanchable erythema; Induration 07/28/23 0900   Margins Attached edges; Defined edges 07/28/23 0900   Number of days: 0   Coccyx:      Response to treatment:  Well tolerated by patient. Pain Assessment:  Severity:  1 / 10  Quality of pain: tender  Wound Pain Timing/Severity: intermittent  Premedicated: No    Plan   Plan of Care: Wound 07/27/23 Coccyx-Dressing/Treatment: Alginate with Ag, Foam  Recommendation: Coccyx - clean with NS, cover wound bed with a piece of alginate ag (Aquacel Advantage), foam dressing, change daily  Remind pt to turn every 2 hours  Call Wound Care for deterioration [phone]    Specialty Bed Required : No   [] Low Air Loss   [] Pressure Redistribution  [] Fluid Immersion  [] Bariatric  [] Total Pressure Relief  [] Other:     Current Diet: ADULT DIET; Regular; 5 carb choices (75 gm/meal); Low Fat/Low Chol/High Fiber/2 gm Na;  Low Potassium (Less than 3000 mg/day)  Dietician consult:  No    Discharge Plan:  Placement for patient upon discharge: intermediate care facility    Patient appropriate for Outpatient [address]: Yes    Referrals:  [x]   [] [address] [facility]  [] Supplies  [] Other    Patient/Caregiver

## 2023-07-28 NOTE — PROGRESS NOTES
Occupational Therapy/ Physical Therapy  Attempted to see pt for OT/PT eval and treat. Pt with PCA just getting back into bed. Pt declined therapy at this time, agreeable to attempting later today as therapy schedule allows. Marcel Castelan.  Nimco Le, OTR/L #[id number]  [address] [place] [address], DPT, NCS, CSRS

## 2023-07-28 NOTE — DISCHARGE INSTRUCTIONS
Extra Heart Failure sites:     https://[employer]. [employer]/publication/?r=297411   --- this is American Heart Association interactive Healthier Living with Heart Failure guidebook. Please click hyperlink or copy / paste link into search bar. Use your mouse to scroll through the pages. Lots of information about weight monitoring, diet tips, activity, meds, etc     HF [place] vikas  -- this is a free smart phone vikas available for iPhone and Android download. Use your phone to track sodium / fluid intake, zone tool symptom tracking, weights, medications, etc. Click on this hyperlink  HF [place] Vikas   for QR code for easy download. DASH (Dietary Approach to Stop Hypertension) diet --  [employer]umni.no -- this diet is a flexible eating plan that promotes heart healthy eating style. Click on hyperlink or copy / paste link into search bar. Lots of low sodium recipes and tips. [employer].ca  -- more free recipes      Podiatry Wound Care Discharge Instructions  Please perform everyday dressing changes to left lower extremity as follows  -Apply Mepilex border to the wound on the outside of the left knee   -Next apply gauze to the top of the left foot, ankle, and leg  -Next loosely wrap the left lower extremity with Kerlix starting from just in front of the toes and ending just below the knee  -Next gently wrap the left foot with 4 inch Ace bandage starting from just in front of the toes and ending just above the ankle  -Next gently wrap the left leg with 6 inch Ace bandage starting from just above the ankle and ending just below the knee  Patient is  weightbearing as tolerated Bilateral lower extremity  Please follow-up with Dr. Mira Meckel DPM for wound recheck and to establish care      Nephrology  After discharge check BP daily.    If BP > 150/90 resume lasix 20 mg daily oral and titrate as needed  Follow up with nephrology in 3-4 weeks if help is needed with HTN control/ CKD   Low salt diet

## 2023-07-28 NOTE — H&P
V2.0  History and Physical      Name:  Xiomara Arteaga /Age/Sex: 10/5/1926  (80 y.o. male)   MRN & CSN:  [id number] & [id number] Encounter Date/Time: 2023 10:04 PM EDT   Location:  [id number]- PCP: Cristobal Kuo MD       Hospital Day: 1    Assessment and Plan:   Xiomara Arteaga is a 80 y.o. male poor historian with a pmh of hypertension, CKD 4, seizure disorder, dementia who presents with Cellulitis of left lower extremity. Hospital Problems             Last Modified POA    * (Principal) Cellulitis of left lower extremity 2023 Yes    Essential hypertension 2023 Yes    Overview Signed 2021  2:08 AM by Mary Salinas MD     Formatting of this note might be different from the original.  ICD-10 Transition           Normocytic anemia 2023 Yes    Gastroesophageal reflux disease 2023 Yes    CKD (chronic kidney disease) stage 4, GFR 15-29 ml/min ([address]) 2023 Yes    Seizure disorder ([address]) 2023 Yes    Overview Signed 2021  2:10 AM by Mary Salinas MD     Formatting of this note might be different from the original.  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 ([address]) 2023 Yes    Glaucoma (increased eye pressure) 2023 Yes    Hyperlipidemia 2023 Yes   Left lower extremity edema    Plan:  Empiric cefazolin  Elevate extremity  Check D-dimer  As needed hydralazine for systolic greater than 423 or diastolic greater than 396  Continue home regimen for chronic stable conditions    Disposition:   Current Living situation: Home  Expected Disposition: Home  Estimated D/C: 3 to 4 days    Diet ADULT DIET; Regular; 5 carb choices (75 gm/meal);  Low Fat/Low Chol/High Fiber/TERRY; Low Potassium (Less than 3000 mg/day)   DVT Prophylaxis [x] Lovenox, []  Heparin, [] SCDs, [] Ambulation,  [] Eliquis, [] Xarelto, [] Coumadin   Code Status Full Code   Surrogate Decision Maker/ POA Daughter     Personally reviewed Lab Studies and Imaging     Discussed by mouth daily    Historical Provider, MD   lisinopril (PRINIVIL;ZESTRIL) 5 MG tablet Take 1 tablet by mouth 2 times daily 7/19/23   Historical Provider, MD   levETIRAcetam (KEPPRA) 500 MG tablet TAKE 1/2 TABLET BY MOUTH IN MORNING AND 1/2 TABLET AT NIGHT 7/16/23   Historical Provider, MD   gabapentin (NEURONTIN) 100 MG capsule Take 1 capsule by mouth 3 times daily. Historical Provider, MD   furosemide (LASIX) 40 MG tablet  7/21/23   Historical Provider, MD   aspirin 81 MG chewable tablet Take 1 tablet by mouth daily    Historical Provider, MD       Physical Exam:    Physical Exam:    General: Frail, chronically ill-appearing but nontoxic in NAD  Eyes: EOMI  ENT: neck supple  Cardiovascular: Regular rate. Respiratory: Clear to auscultation  Gastrointestinal: Soft, non tender  Genitourinary: no suprapubic tenderness  Musculoskeletal: + LLE edema to the knee including the foot  Skin: warm, dry, scabbed abrasion over the lateral aspect of the left knee surrounded by erythema which goes down the leg and the foot  Neuro: Alert, nonfocal.  Psych: Mood irritable, insight decreased. Past Medical History:   PMHx   Past Medical History:   Diagnosis Date    Ambulatory dysfunction     BPH (benign prostatic hyperplasia)     CKD (chronic kidney disease) stage 4, GFR 15-29 ml/min (HCC)     CVA (cerebral vascular accident) ([address])     Diastolic heart failure (HCC)     GERD (gastroesophageal reflux disease)     Glaucoma     Hypercholesterolemia     Hypertension     Macular degeneration     Osteoarthritis of multiple joints     Parkinson's disease ([address])     Peptic ulcer disease     Seizures ([address])     Senile dementia of Alzheimer's type ([address])      PSHX:  has a past surgical history that includes Total hip arthroplasty (Right); Total knee arthroplasty (Right); ORIF femur fracture (Right); and Colonoscopy. Allergies: No Known Allergies  Fam HX: family history includes Heart Disease in his father.   Soc HX:   Social History

## 2023-07-28 NOTE — CONSULTS
Clinical Pharmacy Progress Note    Vancomycin - Management by Pharmacy    Consult Date(s): 07/28/2023   Consulting Provider(s):      Assessment / Plan  SSTI - Vancomycin  Concurrent Antimicrobials: Cefazolin  Day of Vanc Therapy / Ordered Duration: Day 1, Duration TBD (7-10 days)   Current Dosing Method: Intermittent Dosing by Levels  Therapeutic Goal: Trough ~ 15 mg/L  Current Dose / Plan:   Patient to receive loading dose of 1750mg IV x 1 dose (~25mg/kg)   Intermittent dosing placeholder entered, Scr 2.1   CKD (chronic kidney disease) stage 4, GFR 15-29 ml/min (HCC  Random trough 7/29 0600  Will continue to monitor clinical condition and make adjustments to regimen as appropriate. Thank you for consulting pharmacy,    [place], PharmD, [facility]        Interval update:  Initial dosing     Subjective/Objective:   Allen Perez is a 80 y.o. male, poor historian with hypertension, CKD 4, hx of seizure disorder, and dementia  who presented with left leg swelling and redness. Pharmacy is consulted to dose vancomycin. Ht Readings from Last 1 Encounters:   07/27/23 6' (1.829 m)     Wt Readings from Last 1 Encounters:   07/28/23 152 lb 1.9 oz (69 kg)     Current & Prior Antimicrobial Regimen(s):  Cefazolin 1000mg IV q12h  Zosyn 3375mg IV x 1 dose     Vancomycin Level(s) / Doses:    Date Time Dose Type of Level / Level Interpretation                 Note: Serum levels collected for AUC-based dosing may be high if collected in close proximity to the dose administered. This is not necessarily indicative of toxicity. Cultures & Sensitivities:    Date Site Micro Susceptibility / Result                 Recent Labs     07/27/23  1747 07/28/23  0403   CREATININE 1.7* 2.1*   BUN 59* 65*   WBC 12.6* 11.4*       Estimated Creatinine Clearance: 20 mL/min (A) (based on SCr of 2.1 mg/dL (H)). Additional Lab Values / Findings of Note:    No results for input(s): PROCAL in the last 72 hours.

## 2023-07-28 NOTE — CARE COORDINATION
9:28 AM  HCPOA paperwork received via email from Ermias Lindsey. Printed and placed on hard chart.      Electronically signed by Farzana Kruse RN, CM on 7/28/2023 at 9:29 AM.  Phone: [phone]  Fax: [phone]

## 2023-07-28 NOTE — PROGRESS NOTES
4 Eyes Skin Assessment     NAME:  Carol Mcneil OF BIRTH:  10/5/1926  MEDICAL RECORD NUMBER:  [id number]    The patient is being assessed for  Admission    I agree that at least one RN has performed a thorough Head to Toe Skin Assessment on the patient. ALL assessment sites listed below have been assessed. Areas assessed by both nurses:    Head, Face, Ears, Shoulders, Back, Chest, Arms, Elbows, Hands, Sacrum. Buttock, Coccyx, Ischium, and Legs. Feet and Heels        Does the Patient have a Wound? Yes wound(s) were present on assessment.  LDA wound assessment was Initiated and completed by RN       Luther Prevention initiated by RN: Yes  Wound Care Orders initiated by RN: Yes    Pressure Injury (Stage 3,4, Unstageable, DTI, NWPT, and Complex wounds) if present, place Wound referral order by RN under : Yes    New Ostomies, if present place, Ostomy referral order under : No     Nurse 1 eSignature: Electronically signed by Clayton Mi RN on 7/28/23 at 1:55 AM EDT    **SHARE this note so that the co-signing nurse can place an eSignature**    Nurse 2 eSignature: Electronically signed by Farrukh Daley RN on 7/28/23 at 2:10 AM EDT

## 2023-07-28 NOTE — CARE COORDINATION
Case Management Assessment/ Note  Date/ Time of Note: 7/28/2023 8:32 AM  Note completed by: Joanna Jeong RN    If patient is discharged prior to next notation, then this note serves as note for discharge by case management. Patient Name: Annie Barney  YOB: 1926    Diagnosis:Cellulitis of left lower extremity [N83.397]  Cellulitis [L03.90]  Patient Admission Status: Inpatient  Date of Admission:7/27/2023  5:08 PM    Length of Stay: 1 GLOS: GMLOS: 3.2 Readmission Risk Score: Readmission Risk Score: 15.5    __________________________________________________________________________  Discharge Plan: Home; Independent Living at [address] [place] [address] [place] required for SNF: NO  COVID Result:  No results found for: [address] [place] [address] [place] for discharge: family    Tentative discharge date: tbd    Potential assistance Purchasing Medications: Potential Assistance Purchasing Medications: No  Does Patient want to participate in local refill/ meds to beds program?:      Current barriers to discharge: Medical complications      ________________________________________________________________________________________  Case Management Notes: Patient is from [address] [place] [address] at [facility]. Patient admitted for Cellulitis of left lower extremity [L03.116]  Cellulitis [L03.90]  Plan for [place] return to [place] at [place]. Pt continues on a Hep Gtt; IV atbx. Plan for venous imaging. Palliative Care consult in for goals of care discussion    CM will continue to follow for dc planning and support. Meaghan Garduno and his family were provided with choice of provider; he and his family are in agreement with the discharge plan.     Emergency Contacts:  Extended Emergency Contact Information  Primary Emergency Contact: stan loza Phone: [phone]  Mobile Phone: [phone]  Relation: Child  Secondary Emergency Contact: Nicola Loza  [place] Phone: [phone]  Relation: Child  Preferred language: [facility]   needed?  No    Care Transition Patient: Yes    IMM Status:      Abida Yanes RN,   [facility]  Case Management Department  Ph: [phone]  Fax: [phone]

## 2023-07-28 NOTE — PROGRESS NOTES
Pt admitted to room 5303 from [facility] for left lower leg cellulitis. Skin assessment complete with [facility] ROBLES SEPULVEDA. A stage 3 was noted on the coccyx along with scattered skin tears and abrasions. Mepilexes replaced. Pt had a bm at admission, soft and formed. Pt a/ox4, uses a walker at [facility] nursing home. Bed plugged in and bed alarm on. Pt received zosyn prior to arrival and documented in the [facility]     Electronically signed by Sandhya Michael RN on 7/27/23 at 9:10 PM EDT

## 2023-07-28 NOTE — PROGRESS NOTES
Physical Therapy  [facility]  Physical Therapy Initial Assessment/Treatment    Name: Rosalia Ruffin  : 10/5/1926  MRN: [id number]  Date of Service: 2023    Discharge Recommendations:    Rosalia Ruffin scored a 18/24 on the AM-PAC short mobility form. Current research shows that an AM-PAC score of 18 or greater is typically associated with a discharge to the patient's home setting. Based on the patient's AM-PAC score and their current functional mobility deficits, it is recommended that the patient have 2-3 sessions per week of Physical Therapy at d/c to increase the patient's independence. At this time, this patient demonstrates the endurance and safety to discharge home with home services and a follow up treatment frequency of 2-3x/wk. Please see assessment section for further patient specific details. If patient discharges prior to next session this note will serve as a discharge summary. Please see below for the latest assessment towards goals. PT Equipment Recommendations  Equipment Needed: No  Other: pt owns rollator      Patient Diagnosis(es): The encounter diagnosis was Cellulitis of left lower extremity. 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 Past Surgical History:  has a past surgical history that includes Total hip arthroplasty (Right); Total knee arthroplasty (Right); ORIF femur fracture (Right); and Colonoscopy.     Assessment   Assessment: pt presents from \[facility]\"" with his wife where he is normally IND with

## 2023-07-29 LAB
ALBUMIN SERPL-MCNC: 2.5 G/DL (ref 3.4–5)
ALBUMIN/GLOB SERPL: 1 {RATIO} (ref 1.1–2.2)
ALP SERPL-CCNC: 128 U/L (ref 40–129)
ALT SERPL-CCNC: 17 U/L (ref 10–40)
ANION GAP SERPL CALCULATED.3IONS-SCNC: 11 MMOL/L (ref 3–16)
ANTI-XA UNFRAC HEPARIN: 0.1 IU/ML (ref 0.3–0.7)
ANTI-XA UNFRAC HEPARIN: 0.27 IU/ML (ref 0.3–0.7)
ANTI-XA UNFRAC HEPARIN: 0.27 IU/ML (ref 0.3–0.7)
ANTI-XA UNFRAC HEPARIN: 0.33 IU/ML (ref 0.3–0.7)
AST SERPL-CCNC: 27 U/L (ref 15–37)
BASOPHILS # BLD: 0 K/UL (ref 0–0.2)
BASOPHILS NFR BLD: 0.1 %
BILIRUB SERPL-MCNC: 0.4 MG/DL (ref 0–1)
BUN SERPL-MCNC: 56 MG/DL (ref 7–20)
CALCIUM SERPL-MCNC: 7.7 MG/DL (ref 8.3–10.6)
CHLORIDE SERPL-SCNC: 105 MMOL/L (ref 99–110)
CO2 SERPL-SCNC: 24 MMOL/L (ref 21–32)
CREAT SERPL-MCNC: 1.8 MG/DL (ref 0.8–1.3)
DEPRECATED RDW RBC AUTO: 15.3 % (ref 12.4–15.4)
EOSINOPHIL # BLD: 0 K/UL (ref 0–0.6)
EOSINOPHIL NFR BLD: 0.3 %
ERYTHROCYTE [SEDIMENTATION RATE] IN BLOOD BY WESTERGREN METHOD: 32 MM/HR (ref 0–20)
GFR SERPLBLD CREATININE-BSD FMLA CKD-EPI: 34 ML/MIN/{1.73_M2}
GLUCOSE SERPL-MCNC: 143 MG/DL (ref 70–99)
HCT VFR BLD AUTO: 25.7 % (ref 40.5–52.5)
HGB BLD-MCNC: 8.2 G/DL (ref 13.5–17.5)
LYMPHOCYTES # BLD: 4.2 K/UL (ref 1–5.1)
LYMPHOCYTES NFR BLD: 40.6 %
MCH RBC QN AUTO: 28.4 PG (ref 26–34)
MCHC RBC AUTO-ENTMCNC: 32 G/DL (ref 31–36)
MCV RBC AUTO: 88.5 FL (ref 80–100)
MONOCYTES # BLD: 0.5 K/UL (ref 0–1.3)
MONOCYTES NFR BLD: 5.2 %
NEUTROPHILS # BLD: 5.5 K/UL (ref 1.7–7.7)
NEUTROPHILS NFR BLD: 53.8 %
PLATELET # BLD AUTO: 195 K/UL (ref 135–450)
PMV BLD AUTO: 8.5 FL (ref 5–10.5)
POTASSIUM SERPL-SCNC: 4.3 MMOL/L (ref 3.5–5.1)
PREALB SERPL-MCNC: 5 MG/DL (ref 20–40)
PROT SERPL-MCNC: 5.1 G/DL (ref 6.4–8.2)
RBC # BLD AUTO: 2.9 M/UL (ref 4.2–5.9)
SODIUM SERPL-SCNC: 140 MMOL/L (ref 136–145)
VANCOMYCIN SERPL-MCNC: 12.2 UG/ML
WBC # BLD AUTO: 10.2 K/UL (ref 4–11)

## 2023-07-29 PROCEDURE — 6360000002 HC RX W HCPCS: Performed by: INTERNAL MEDICINE

## 2023-07-29 PROCEDURE — 85520 HEPARIN ASSAY: CPT

## 2023-07-29 PROCEDURE — 2580000003 HC RX 258: Performed by: INTERNAL MEDICINE

## 2023-07-29 PROCEDURE — 85025 COMPLETE CBC W/AUTO DIFF WBC: CPT

## 2023-07-29 PROCEDURE — 1200000000 HC SEMI PRIVATE

## 2023-07-29 PROCEDURE — 36415 COLL VENOUS BLD VENIPUNCTURE: CPT

## 2023-07-29 PROCEDURE — 6370000000 HC RX 637 (ALT 250 FOR IP): Performed by: INTERNAL MEDICINE

## 2023-07-29 PROCEDURE — 87641 MR-STAPH DNA AMP PROBE: CPT

## 2023-07-29 PROCEDURE — 51798 US URINE CAPACITY MEASURE: CPT

## 2023-07-29 PROCEDURE — 80202 ASSAY OF VANCOMYCIN: CPT

## 2023-07-29 PROCEDURE — 80053 COMPREHEN METABOLIC PANEL: CPT

## 2023-07-29 RX ORDER — HYDRALAZINE HYDROCHLORIDE 20 MG/ML
10 INJECTION INTRAMUSCULAR; INTRAVENOUS EVERY 6 HOURS PRN
Status: DISCONTINUED | OUTPATIENT
Start: 2023-07-29 | End: 2023-08-01

## 2023-07-29 RX ORDER — AMLODIPINE BESYLATE 5 MG/1
5 TABLET ORAL DAILY
Status: DISCONTINUED | OUTPATIENT
Start: 2023-07-29 | End: 2023-07-31

## 2023-07-29 RX ADMIN — LATANOPROST 1 DROP: 50 SOLUTION OPHTHALMIC at 21:27

## 2023-07-29 RX ADMIN — SODIUM CHLORIDE, PRESERVATIVE FREE 10 ML: 5 INJECTION INTRAVENOUS at 21:27

## 2023-07-29 RX ADMIN — THIAMINE HYDROCHLORIDE 100 MG: 100 INJECTION, SOLUTION INTRAMUSCULAR; INTRAVENOUS at 09:39

## 2023-07-29 RX ADMIN — HEPARIN SODIUM 2780 UNITS: 1000 INJECTION INTRAVENOUS; SUBCUTANEOUS at 02:58

## 2023-07-29 RX ADMIN — Medication 3 MG: at 21:27

## 2023-07-29 RX ADMIN — SODIUM CHLORIDE, PRESERVATIVE FREE 10 ML: 5 INJECTION INTRAVENOUS at 09:44

## 2023-07-29 RX ADMIN — HEPARIN SODIUM 2780 UNITS: 1000 INJECTION INTRAVENOUS; SUBCUTANEOUS at 19:55

## 2023-07-29 RX ADMIN — VANCOMYCIN HYDROCHLORIDE 1250 MG: 10 INJECTION, POWDER, LYOPHILIZED, FOR SOLUTION INTRAVENOUS at 11:12

## 2023-07-29 RX ADMIN — ASPIRIN 81 MG 81 MG: 81 TABLET ORAL at 09:39

## 2023-07-29 RX ADMIN — LEVETIRACETAM 250 MG: 500 TABLET, FILM COATED ORAL at 09:39

## 2023-07-29 RX ADMIN — HEPARIN SODIUM 26 UNITS/KG/HR: 10000 INJECTION, SOLUTION INTRAVENOUS at 13:01

## 2023-07-29 RX ADMIN — AMLODIPINE BESYLATE 5 MG: 5 TABLET ORAL at 13:58

## 2023-07-29 RX ADMIN — HEPARIN SODIUM 2780 UNITS: 1000 INJECTION INTRAVENOUS; SUBCUTANEOUS at 14:27

## 2023-07-29 RX ADMIN — LEVETIRACETAM 250 MG: 500 TABLET, FILM COATED ORAL at 21:27

## 2023-07-29 RX ADMIN — CEFAZOLIN 1000 MG: 1 INJECTION, POWDER, FOR SOLUTION INTRAMUSCULAR; INTRAVENOUS at 21:31

## 2023-07-29 RX ADMIN — CEFAZOLIN 1000 MG: 1 INJECTION, POWDER, FOR SOLUTION INTRAMUSCULAR; INTRAVENOUS at 09:52

## 2023-07-29 NOTE — PROGRESS NOTES
Comprehensive Nutrition Assessment    RECOMMENDATIONS:  PO Diet: Continue Regular; 5 carb choices; Low Fat/Low Chol/2 gm Na; Low Potassium  ONS: Begin Expedite jello cup qd  Nutrition Education: No recommendation at this time       NUTRITION ASSESSMENT:   Nutritional summary & status: Positive nutrition screen for wound. Pt occupied with other staff on attempted visit. Meal intake good at % through admit. No wt hx per EMR. Increased nutrient needs indicated due to PI st 3 on coccyx. Currently on a Regular; 5 carb choices ,cardiac, low potassium diet. Due to CKD st IV and lower protein needs, will only add Expedite jello cup to assist with wound healing. Will continue to monitor. Admission/PMH: Cellulitis of LLE//dementia, CKD st IV, HLD    MALNUTRITION ASSESSMENT  Context of Malnutrition: Acute Illness   Malnutrition Status: Insufficient data  NUTRITION DIAGNOSIS   Increased nutrient needs related to increase demand for energy/nutrients as evidenced by wounds    Nutrition Monitoring and Evaluation:   Food/Nutrient Intake Outcomes:  Food and Nutrient Intake, Supplement Intake  Physical Signs/Symptoms Outcomes:  Biochemical Data, Nutrition Focused Physical Findings, Skin, Weight     OBJECTIVE DATA: Significant to nutrition assessment  Nutrition Related Findings: +BM7/29. Bun 56 cre 1.8. No edema noted. Wounds: Pressure Injury, Stage III, Skin Tears  Nutrition Goals: Meet at least 75% of estimated needs, prior to discharge     [address] [facility] [address],[facility] [address]; Regular; 5 carb choices (75 gm/meal); Low Fat/Low Chol/High Fiber/2 gm Na;  Low Potassium (Less than 3000 mg/day)  PO Intake: %   PO Supplement Intake:None Ordered  Additional Sources of Calories/IVF:n/a     COMPARATIVE STANDARDS  Energy (kcal):  9603-4465 (25-30 kcal/CBW)     Protein (g):  55-69 (.8-1.0 gm/CBW)       Fluid (ml/day):  1 ml/kcal or per MD    ANTHROPOMETRICS  Current Height: 6' (182.9 cm)  Current Weight - Scale: 152 lb 1.9 oz (69 kg)    Admission weight: 153 lb (69.4 kg)    The patient will be monitored per nutrition standards of care. Consult dietitian if additional nutrition interventions are needed prior to RD reassessment.      Vanesa Canchola, [address]:  [phone]  Office:  [phone]

## 2023-07-29 NOTE — PROGRESS NOTES
Clinical Pharmacy Progress Note    Vancomycin - Management by Pharmacy    Consult Date(s): 7/28/23  Consulting Provider(s): Dr. Nu Adkins / Plan  1) LLE cellulitis - Vancomycin  Concurrent Antimicrobials: Cefazolin  Day of Vanc Therapy / Ordered Duration: day 2 of 7-10 days  Current Dosing Method: Intermittent Dosing by Levels  Therapeutic Goal: Trough 10-15 mg/L  Current Dose / Plan:   Pt with CKD 4 - unclear baseline, but may be ~1.5 per review of outpt labs. Will dose intermittently based on levels for now. SCr today = 1.8.  UOP not documented. Random level this AM = 12.2 mcg/mL after receiving loading dose last evening. Will give 1250mg IV x1 today. Will check random level in AM tomorrow. Will continue to monitor clinical condition and make adjustments to regimen as appropriate. Please call with questions--  Thanks--  Princess Hardin, PharmD, BCPS, BCGP  [id number] ([facility])   7/29/2023 8:41 AM        Interval update:  Venous doppler pending to r/o DVT. On heparin drip. Vancomycin added last evening. Subjective/Objective:   Torie Clement is a 80 y.o. male with a PMHx significant for CKD 4, seizures, and dementia who is admitted with LLE swelling / redness concerning for cellulitis vs DVT. Pharmacy is consulted to dose Vancomycin. Ht Readings from Last 1 Encounters:   07/27/23 6' (1.829 m)     Wt Readings from Last 1 Encounters:   07/28/23 152 lb 1.9 oz (69 kg)     Current & Prior Antimicrobial Regimen(s):  Cefazolin (7/28-current)  Vancomycin - Pharmacy to dose  Intermittent dosing (7/28-current)    Vancomycin Level(s) / Doses:    Date Time Dose Type of Level / Level Interpretation   7/28 18:39 1750mg x1 LD  Intermittent dosing. SCr 2.1.   7/29 07:06  10:00 --  1250mg x1 ordered Random = 12.2 mcg/mL Intermittent dosing. SCr 1.8. Note: Serum levels collected for AUC-based dosing may be high if collected in close proximity to the dose administered.  This is not necessarily

## 2023-07-29 NOTE — PROGRESS NOTES
Duplex result came back. Medicine NP Mary was made aware to reeval heparin gtt. This RN was told to continue the gtt. Gtt was restarted.

## 2023-07-29 NOTE — PROGRESS NOTES
A&Ox4. VSS with exception of elevated BP. Denies pain. LLE dressing CDI. Antixa therapeutic x1. On recheck, increased hep gtt to 28U/kg/h and administered bolus per orders. Pt uses surg boot with ambulation and prevalon boot in place while in bed with LLE elevated w/ pillow support. Pt is incontinent of urine and bowel at times. Bladder scan performed--30min post voiding 200mLs result (310mls). IV abx administered per orders. Wife at bedside all day, and multiple children visited.

## 2023-07-29 NOTE — PLAN OF CARE
Problem: Discharge Planning  Goal: Discharge to home or other facility with appropriate resources  Outcome: Progressing  Flowsheets (Taken 7/28/2023 2007)  Discharge to home or other facility with appropriate resources:   Identify barriers to discharge with patient and caregiver   Arrange for needed discharge resources and transportation as appropriate   Identify discharge learning needs (meds, wound care, etc)   Refer to discharge planning if patient needs post-hospital services based on physician order or complex needs related to functional status, cognitive ability or social support system     Problem: Pain  Goal: Verbalizes/displays adequate comfort level or baseline comfort level  Outcome: Progressing     Problem: Chronic Conditions and Co-morbidities  Goal: Patient's chronic conditions and co-morbidity symptoms are monitored and maintained or improved  Outcome: Progressing     Problem: Safety - Adult  Goal: Free from fall injury  Outcome: Progressing     Problem: Skin/Tissue Integrity  Goal: Absence of new skin breakdown  Description: 1. Monitor for areas of redness and/or skin breakdown  2. Assess vascular access sites hourly  3. Every 4-6 hours minimum:  Change oxygen saturation probe site  4. Every 4-6 hours:  If on nasal continuous positive airway pressure, respiratory therapy assess nares and determine need for appliance change or resting period.   Outcome: Progressing     Problem: ABCDS Injury Assessment  Goal: Absence of physical injury  Outcome: Progressing  Flowsheets (Taken 7/28/2023 2007)  Absence of Physical Injury: Implement safety measures based on patient assessment

## 2023-07-29 NOTE — PROGRESS NOTES
Pt oa4, vss. Denied any pain, n/v,sob. Dressing on LLE CDI. antiXA was 0.10 so a bolus of 2780U heparin was given & heparin gtt increased to 26U/kg/h. Bed alarm on. Will cont to monitor.

## 2023-07-30 PROBLEM — N18.30 STAGE 3 CHRONIC KIDNEY DISEASE (HCC): Status: ACTIVE | Noted: 2023-07-30

## 2023-07-30 PROBLEM — E46 MALNUTRITION (HCC): Status: ACTIVE | Noted: 2023-07-30

## 2023-07-30 LAB
ALBUMIN SERPL-MCNC: 2.5 G/DL (ref 3.4–5)
ANION GAP SERPL CALCULATED.3IONS-SCNC: 8 MMOL/L (ref 3–16)
ANTI-XA UNFRAC HEPARIN: 0.32 IU/ML (ref 0.3–0.7)
ANTI-XA UNFRAC HEPARIN: 0.54 IU/ML (ref 0.3–0.7)
BACTERIA UR CULT: ABNORMAL
BASOPHILS # BLD: 0 K/UL (ref 0–0.2)
BASOPHILS NFR BLD: 0 %
BUN SERPL-MCNC: 58 MG/DL (ref 7–20)
CALCIUM SERPL-MCNC: 7.7 MG/DL (ref 8.3–10.6)
CHLORIDE SERPL-SCNC: 105 MMOL/L (ref 99–110)
CO2 SERPL-SCNC: 26 MMOL/L (ref 21–32)
CREAT SERPL-MCNC: 1.6 MG/DL (ref 0.8–1.3)
CRP SERPL-MCNC: 124.7 MG/L (ref 0–5.1)
DEPRECATED RDW RBC AUTO: 15.3 % (ref 12.4–15.4)
EOSINOPHIL # BLD: 0.2 K/UL (ref 0–0.6)
EOSINOPHIL NFR BLD: 2 %
GFR SERPLBLD CREATININE-BSD FMLA CKD-EPI: 39 ML/MIN/{1.73_M2}
GLUCOSE SERPL-MCNC: 121 MG/DL (ref 70–99)
HCT VFR BLD AUTO: 25.9 % (ref 40.5–52.5)
HGB BLD-MCNC: 8.2 G/DL (ref 13.5–17.5)
LYMPHOCYTES # BLD: 6 K/UL (ref 1–5.1)
LYMPHOCYTES NFR BLD: 58 %
MCH RBC QN AUTO: 28.6 PG (ref 26–34)
MCHC RBC AUTO-ENTMCNC: 31.9 G/DL (ref 31–36)
MCV RBC AUTO: 89.7 FL (ref 80–100)
MONOCYTES # BLD: 0.4 K/UL (ref 0–1.3)
MONOCYTES NFR BLD: 4 %
MRSA DNA SPEC QL NAA+PROBE: NORMAL
NEUTROPHILS # BLD: 3.7 K/UL (ref 1.7–7.7)
NEUTROPHILS NFR BLD: 36 %
ORGANISM: ABNORMAL
PHOSPHATE SERPL-MCNC: 3.2 MG/DL (ref 2.5–4.9)
PLATELET # BLD AUTO: 212 K/UL (ref 135–450)
PMV BLD AUTO: 8.3 FL (ref 5–10.5)
POTASSIUM SERPL-SCNC: 4.7 MMOL/L (ref 3.5–5.1)
RBC # BLD AUTO: 2.89 M/UL (ref 4.2–5.9)
SODIUM SERPL-SCNC: 139 MMOL/L (ref 136–145)
VANCOMYCIN SERPL-MCNC: 16 UG/ML
WBC # BLD AUTO: 10.3 K/UL (ref 4–11)

## 2023-07-30 PROCEDURE — 6360000002 HC RX W HCPCS: Performed by: INTERNAL MEDICINE

## 2023-07-30 PROCEDURE — 80069 RENAL FUNCTION PANEL: CPT

## 2023-07-30 PROCEDURE — 99223 1ST HOSP IP/OBS HIGH 75: CPT | Performed by: INTERNAL MEDICINE

## 2023-07-30 PROCEDURE — 6370000000 HC RX 637 (ALT 250 FOR IP): Performed by: INTERNAL MEDICINE

## 2023-07-30 PROCEDURE — 36415 COLL VENOUS BLD VENIPUNCTURE: CPT

## 2023-07-30 PROCEDURE — 2580000003 HC RX 258: Performed by: INTERNAL MEDICINE

## 2023-07-30 PROCEDURE — 82570 ASSAY OF URINE CREATININE: CPT

## 2023-07-30 PROCEDURE — 85025 COMPLETE CBC W/AUTO DIFF WBC: CPT

## 2023-07-30 PROCEDURE — 86140 C-REACTIVE PROTEIN: CPT

## 2023-07-30 PROCEDURE — 80202 ASSAY OF VANCOMYCIN: CPT

## 2023-07-30 PROCEDURE — 85520 HEPARIN ASSAY: CPT

## 2023-07-30 PROCEDURE — 84156 ASSAY OF PROTEIN URINE: CPT

## 2023-07-30 PROCEDURE — 1200000000 HC SEMI PRIVATE

## 2023-07-30 RX ORDER — GAUZE BANDAGE 2" X 2"
100 BANDAGE TOPICAL DAILY
Status: DISCONTINUED | OUTPATIENT
Start: 2023-07-31 | End: 2023-08-01 | Stop reason: HOSPADM

## 2023-07-30 RX ORDER — CHOLECALCIFEROL (VITAMIN D3) 1250 MCG
1000 CAPSULE ORAL DAILY
COMMUNITY

## 2023-07-30 RX ORDER — LOPERAMIDE HYDROCHLORIDE 2 MG/1
2 CAPSULE ORAL DAILY
COMMUNITY

## 2023-07-30 RX ADMIN — CEFAZOLIN 1000 MG: 1 INJECTION, POWDER, FOR SOLUTION INTRAMUSCULAR; INTRAVENOUS at 09:32

## 2023-07-30 RX ADMIN — ASPIRIN 81 MG 81 MG: 81 TABLET ORAL at 09:19

## 2023-07-30 RX ADMIN — Medication 3 MG: at 20:10

## 2023-07-30 RX ADMIN — HEPARIN SODIUM 30 UNITS/KG/HR: 10000 INJECTION, SOLUTION INTRAVENOUS at 03:32

## 2023-07-30 RX ADMIN — THIAMINE HYDROCHLORIDE 100 MG: 100 INJECTION, SOLUTION INTRAMUSCULAR; INTRAVENOUS at 09:20

## 2023-07-30 RX ADMIN — VANCOMYCIN HYDROCHLORIDE 1000 MG: 10 INJECTION, POWDER, LYOPHILIZED, FOR SOLUTION INTRAVENOUS at 12:36

## 2023-07-30 RX ADMIN — AMLODIPINE BESYLATE 5 MG: 5 TABLET ORAL at 09:20

## 2023-07-30 RX ADMIN — LISINOPRIL 5 MG: 5 TABLET ORAL at 20:10

## 2023-07-30 RX ADMIN — CEFTRIAXONE 1000 MG: 1 INJECTION, POWDER, FOR SOLUTION INTRAMUSCULAR; INTRAVENOUS at 20:10

## 2023-07-30 RX ADMIN — LATANOPROST 1 DROP: 50 SOLUTION OPHTHALMIC at 20:12

## 2023-07-30 RX ADMIN — LEVETIRACETAM 250 MG: 500 TABLET, FILM COATED ORAL at 09:19

## 2023-07-30 RX ADMIN — LEVETIRACETAM 250 MG: 500 TABLET, FILM COATED ORAL at 20:10

## 2023-07-30 NOTE — PROGRESS NOTES
A&OX4. Hypertensive otherwise VSS. Denies pain. OOB. UTC x5. Ambulating x1 w/ walker. Hep gtt d/c'd. IV abx administered per orders. Mepilex changed to coccyx and bilat elbows. Wife at bedside throughout this shift. And multiple other family members visited. PO intake excellent. Abdomen less distended than yesterday. Incontinent and voiding adequately.

## 2023-07-30 NOTE — PROGRESS NOTES
Pt alert oriented, c/o specialty mattress that kept moving and uncomfortable as well as \"brutal\" that kept him awake. pt was educated about the therapy of alternating pressure relief but still requested his old bed back. Charge RN made aware. Regular bed switched back for pt. Fall precaution is reactive w new bed. Will cont to monitor.

## 2023-07-30 NOTE — CONSULTS
Nephrology Consult Note                                                                                                                                                                                                                                                                                                                                                               Office : [phone]     Fax :[phone]              Patient's Name: Thomas Gillis  6:44 PM  7/30/2023      Assessment/Plan   CKD 3     2. HTN uncontrolled     3. Anemia    4. Acid- base/ Electrolyte imbalance     5. Left leg swelling and pain, probable Cellulitis    6.  Fall     Plan   - check Orthostats   - Ur studies  - will adjust meds once orthostats back   - GFR stable   - On abx for cellulitis   - ARBEN   - PT/OT      Recommend to dose adjust all medications  based on renal functions  Maintain SBP> 90 mmHg   Daily weights   AVOID NSAIDs  Avoid Nephrotoxins  Monitor Intake/Output  Call if significant decrease in urine output           Reason for Consult:  HTN   Requesting Physician:  Bert Chin MD      Chief Complaint:  Fall      History of Present Ilness:    81 y/o male came with fall   He also has cellulitis   Cr 1.7   GFR stable     BP elevated   No dizziness   No SOB     Pt has dementia     Albumin low           Interval hx       Past Medical History:   Diagnosis Date    Ambulatory dysfunction     BPH (benign prostatic hyperplasia)     CKD (chronic kidney disease) stage 4, GFR 15-29 ml/min (HCC)     CVA (cerebral vascular accident) ([address])     Diastolic heart failure (HCC)     GERD (gastroesophageal reflux disease)     Glaucoma     Hypercholesterolemia     Hypertension     Macular degeneration     Osteoarthritis of multiple joints     Parkinson's disease ([address])     Peptic ulcer disease     Seizures ([address])     Senile dementia of Alzheimer's type [facility])        Past Surgical History:   Procedure Laterality Date    COLONOSCOPY

## 2023-07-30 NOTE — PROGRESS NOTES
Called and spoke w/ patient's daughter, Jessica to review at home meds. Update list with what she could provide. She states she will be in later, and will bring a print out of current medications w/ correct dosages--as she was unsure on dosages of 2 medications.   Also, could not add the following to med list:     Preservision PO tab daily  Beano daily  ADDENDUM- taking furosemide 40mg BID

## 2023-07-30 NOTE — PROGRESS NOTES
Anti Xa this morning was 0.32 - therapeutic so no change made to heparin gtt. Currently running at 30U/kg/h.

## 2023-07-30 NOTE — DISCHARGE INSTR - COC
Continuity of Care Form    Patient Name: Arturo Mclean   :  10/5/1926  MRN:  [id number]    Admit date:  2023  Discharge date:  ***    Code Status Order: Full Code   Advance Directives:     Admitting Physician:  Cuauhtemoc Aguilera MD  PCP: Jannie Tirado MD    Discharging Nurse: [facility] Unit/Room#: 4752/2424-96  Discharging Unit Phone Number: ***    Emergency Contact:   Extended Emergency Contact Information  Primary Emergency Contact: stan loza  Home Phone: [phone]  Mobile Phone: [phone]  Relation: Child  Secondary Emergency Contact: Nicola Loza  Home Phone: [phone]  Relation: Child  Preferred language: English   needed?  No    Past Surgical History:  Past Surgical History:   Procedure Laterality Date    COLONOSCOPY      ORIF FEMUR FRACTURE Right     TOTAL HIP ARTHROPLASTY Right     TOTAL KNEE ARTHROPLASTY Right        Immunization History:   Immunization History   Administered Date(s) Administered    COVID-19, MODERNA BLUE border, Primary or Immunocompromised, (age 12y+), IM, 100 mcg/0.5mL 2021, 2021    COVID-19, MODERNA Bivalent, (age 12y+), IM, 48 mcg/0.5 mL 2022    Hep A, HAVRIX, VAQTA, (age 17m-24y), IM, 0.5mL 2001, 2002    Influenza Virus Vaccine 2010    Pneumococcal Conjugate 7-valent (Ova Plan) 2015    Pneumococcal, PPSV23, PNEUMOVAX 21, (age 2y+), SC/IM, 0.5mL 2001    Poliovirus, IPOL, (age 6w+), SC/IM, 0.5mL 2001    TD 5LF, [place] Phenes, (age 7y+), IM, 0.5mL 2001    TDaP, ADACEL (age 6y-58y), BOOSTRIX (age 10y+), IM, 0.5mL 2015    Yellow Fever (YF-Vax) 2001       Active Problems:  Patient Active Problem List   Diagnosis Code    Essential hypertension I10    Abnormal gait R26.9    Advance directive discussed with patient Z71.89    Age-related macular degeneration, wet, both eyes ([address]) H35.3230    Normocytic anemia D64.9    Benign non-nodular prostatic hyperplasia with lower Video (Video Swallowing Test): {Done Not Done EXII:264666914}    Treatments at the Time of Hospital Discharge:   Respiratory Treatments: ***  Oxygen Therapy:  {Therapy; copd oxygen:12977}  Ventilator:    {MH CC Vent UPXB:088788030}    Heart Failure Instructions for Daily Management  Patient was treated for chronic diastolic heart failure. he  will require the following:    Please weigh daily on the same scale and approximately the same time of day. Report weight gain of 3 pounds/day or 5 pounds/week to : Terrell Hester [phone]. Please use hospital discharge weight as baseline reference. Please monitor for signs and symptoms of and report to MD:  Worsening Heart Failure: sudden weight gain, shortness of breath, lower extremity or general edema/swelling, abdominal bloating/swelling, inability to lie flat, intolerance to usual activity, or cough (especially at night). Report these finding even if no increase in weight. Dehydration:  having difficulty or a decrease in urination, dizziness, worsening fatigue, or new onset/worsening of generalized weakness. Please continue a LOW SODIUM diet and LIMIT fluid intake to 48 - 64 ounces ( 1.5 - 2 liters) per day. Call Terrell Hester [phone] with any questions or concerns. Please continue heart failure education to patient and family/support system. See After Visit Summary for hospital follow up appointment details. Consider spiritual care referral for support and/or completion of advance directives . Consider: having the facility MD complete required 7 day follow up.   Patient's primary cardiology provider is Dr. Luz Brumfield        Rehab Therapies: {THERAPEUTIC INTERVENTION:8181474493}  Weight Bearing Status/Restrictions: [facility] Weight Bearin}  Other Medical Equipment (for information only, NOT a DME order):  {EQUIPMENT:883741625}  Other Treatments: ***    Patient's personal belongings (please select all that are

## 2023-07-30 NOTE — CONSULTS
Clinical Pharmacy Progress Note    Vancomycin has been discontinued - Pharmacy will sign off on dosing  If resumed, please re-consult Pharmacy     Please call with questions:  [phone] ([address])    Isrrael Connolly. [facility]    7/30/2023 2:41 PM

## 2023-07-30 NOTE — PROGRESS NOTES
Clinical Pharmacy Progress Note    Vancomycin - Management by Pharmacy    Consult Date(s): 7/28/23  Consulting Provider(s): Dr. Christopher Pittman / Plan  1) LLE cellulitis - Vancomycin  Concurrent Antimicrobials: Cefazolin  Day of Vanc Therapy / Ordered Duration: day 3 of 7-10 days  Current Dosing Method: Intermittent Dosing by Levels  Therapeutic Goal: Trough 10-15 mg/L  Current Dose / Plan:   Pt with CKD 4 - unclear baseline, but may be ~1.5 per review of outpt labs. Will dose intermittently based on levels for now. SCr today = 1.6. UOP documented as 0.1 ml/kg/hr (unsure if consistently documented). Random level this AM = 16 mcg/mL. Will give 1000mg IV x1 today. Will check random level in AM tomorrow. Will continue to monitor clinical condition and make adjustments to regimen as appropriate. Please call with questions--  Thanks--  Leslee Shabazz, PharmD, BCPS, BCGP  [id number] ([facility])   7/30/2023 11:35 AM        Interval update:  No acute DVT - heparin drip d/c'd. Urine growing E.coli. Subjective/Objective:   Amberly Villanueva is a 80 y.o. male with a PMHx significant for CKD 4, seizures, and dementia who is admitted with LLE swelling / redness concerning for cellulitis vs DVT. Pharmacy is consulted to dose Vancomycin. Ht Readings from Last 1 Encounters:   07/27/23 6' (1.829 m)     Wt Readings from Last 1 Encounters:   07/28/23 152 lb 1.9 oz (69 kg)     Current & Prior Antimicrobial Regimen(s):  Cefazolin (7/28-current)  Vancomycin - Pharmacy to dose  Intermittent dosing (7/28-current)    Vancomycin Level(s) / Doses:    Date Time Dose Type of Level / Level Interpretation   7/28 18:39 1750mg x1 LD  Intermittent dosing. SCr 2.1.   7/29 07:06  11:12 --  1250mg x1  Random = 12.2 mcg/mL Intermittent dosing. SCr 1.8.   7/30 02:48  12:00 --  1000mg x1 Random = 16 mcg/mL Intermittent dosing. SCr 1.6.    Note: Serum levels collected for AUC-based dosing may be high if collected in close

## 2023-07-30 NOTE — PLAN OF CARE
Problem: Safety - Adult  Goal: Free from fall injury  Outcome: Progressing   Patient to notify staff when need for ambulation.

## 2023-07-31 LAB
ALBUMIN SERPL-MCNC: 2.5 G/DL (ref 3.4–5)
ANION GAP SERPL CALCULATED.3IONS-SCNC: 8 MMOL/L (ref 3–16)
BASOPHILS # BLD: 0.1 K/UL (ref 0–0.2)
BASOPHILS NFR BLD: 1 %
BUN SERPL-MCNC: 48 MG/DL (ref 7–20)
CALCIUM SERPL-MCNC: 8.1 MG/DL (ref 8.3–10.6)
CHLORIDE SERPL-SCNC: 109 MMOL/L (ref 99–110)
CO2 SERPL-SCNC: 25 MMOL/L (ref 21–32)
CREAT SERPL-MCNC: 1.3 MG/DL (ref 0.8–1.3)
CREAT UR-MCNC: 70.6 MG/DL (ref 39–259)
CRP SERPL-MCNC: 59.7 MG/L (ref 0–5.1)
DEPRECATED RDW RBC AUTO: 14.5 % (ref 12.4–15.4)
EOSINOPHIL # BLD: 0 K/UL (ref 0–0.6)
EOSINOPHIL NFR BLD: 0 %
FERRITIN SERPL IA-MCNC: 206.9 NG/ML (ref 30–400)
GFR SERPLBLD CREATININE-BSD FMLA CKD-EPI: 50 ML/MIN/{1.73_M2}
GLUCOSE SERPL-MCNC: 114 MG/DL (ref 70–99)
HCT VFR BLD AUTO: 26.3 % (ref 40.5–52.5)
HGB BLD-MCNC: 8.7 G/DL (ref 13.5–17.5)
LYMPHOCYTES # BLD: 5.5 K/UL (ref 1–5.1)
LYMPHOCYTES NFR BLD: 55 %
MCH RBC QN AUTO: 29 PG (ref 26–34)
MCHC RBC AUTO-ENTMCNC: 32.9 G/DL (ref 31–36)
MCV RBC AUTO: 88.1 FL (ref 80–100)
MONOCYTES # BLD: 0.4 K/UL (ref 0–1.3)
MONOCYTES NFR BLD: 4 %
NEUTROPHILS # BLD: 4 K/UL (ref 1.7–7.7)
NEUTROPHILS NFR BLD: 40 %
NT-PROBNP SERPL-MCNC: 7895 PG/ML (ref 0–449)
PHOSPHATE SERPL-MCNC: 2.7 MG/DL (ref 2.5–4.9)
PLATELET # BLD AUTO: 233 K/UL (ref 135–450)
PMV BLD AUTO: 8.4 FL (ref 5–10.5)
POTASSIUM SERPL-SCNC: 4.3 MMOL/L (ref 3.5–5.1)
PROT UR-MCNC: 177 MG/DL
PROT/CREAT UR-RTO: 2.5 MG/DL
RBC # BLD AUTO: 2.99 M/UL (ref 4.2–5.9)
RBC MORPH BLD: NORMAL
SODIUM SERPL-SCNC: 142 MMOL/L (ref 136–145)
WBC # BLD AUTO: 10 K/UL (ref 4–11)

## 2023-07-31 PROCEDURE — 36415 COLL VENOUS BLD VENIPUNCTURE: CPT

## 2023-07-31 PROCEDURE — 99233 SBSQ HOSP IP/OBS HIGH 50: CPT | Performed by: INTERNAL MEDICINE

## 2023-07-31 PROCEDURE — 6360000002 HC RX W HCPCS: Performed by: INTERNAL MEDICINE

## 2023-07-31 PROCEDURE — 97116 GAIT TRAINING THERAPY: CPT

## 2023-07-31 PROCEDURE — 6370000000 HC RX 637 (ALT 250 FOR IP): Performed by: INTERNAL MEDICINE

## 2023-07-31 PROCEDURE — 2580000003 HC RX 258: Performed by: INTERNAL MEDICINE

## 2023-07-31 PROCEDURE — 83880 ASSAY OF NATRIURETIC PEPTIDE: CPT

## 2023-07-31 PROCEDURE — 97535 SELF CARE MNGMENT TRAINING: CPT

## 2023-07-31 PROCEDURE — 80069 RENAL FUNCTION PANEL: CPT

## 2023-07-31 PROCEDURE — 86140 C-REACTIVE PROTEIN: CPT

## 2023-07-31 PROCEDURE — 85025 COMPLETE CBC W/AUTO DIFF WBC: CPT

## 2023-07-31 PROCEDURE — 82728 ASSAY OF FERRITIN: CPT

## 2023-07-31 PROCEDURE — 97530 THERAPEUTIC ACTIVITIES: CPT

## 2023-07-31 PROCEDURE — 1200000000 HC SEMI PRIVATE

## 2023-07-31 RX ORDER — AMLODIPINE BESYLATE 10 MG/1
10 TABLET ORAL DAILY
Status: DISCONTINUED | OUTPATIENT
Start: 2023-08-01 | End: 2023-08-01 | Stop reason: HOSPADM

## 2023-07-31 RX ADMIN — ASPIRIN 81 MG 81 MG: 81 TABLET ORAL at 08:53

## 2023-07-31 RX ADMIN — LATANOPROST 1 DROP: 50 SOLUTION OPHTHALMIC at 22:32

## 2023-07-31 RX ADMIN — Medication 3 MG: at 22:30

## 2023-07-31 RX ADMIN — ACETAMINOPHEN 650 MG: 325 TABLET ORAL at 12:53

## 2023-07-31 RX ADMIN — AMLODIPINE BESYLATE 5 MG: 5 TABLET ORAL at 08:53

## 2023-07-31 RX ADMIN — LEVETIRACETAM 250 MG: 500 TABLET, FILM COATED ORAL at 08:54

## 2023-07-31 RX ADMIN — LISINOPRIL 5 MG: 5 TABLET ORAL at 22:30

## 2023-07-31 RX ADMIN — LEVETIRACETAM 250 MG: 500 TABLET, FILM COATED ORAL at 22:30

## 2023-07-31 RX ADMIN — SODIUM CHLORIDE, PRESERVATIVE FREE 10 ML: 5 INJECTION INTRAVENOUS at 08:55

## 2023-07-31 RX ADMIN — SODIUM CHLORIDE, PRESERVATIVE FREE 10 ML: 5 INJECTION INTRAVENOUS at 22:32

## 2023-07-31 RX ADMIN — LISINOPRIL 5 MG: 5 TABLET ORAL at 08:53

## 2023-07-31 RX ADMIN — Medication 100 MG: at 08:54

## 2023-07-31 RX ADMIN — CEFTRIAXONE 1000 MG: 1 INJECTION, POWDER, FOR SOLUTION INTRAMUSCULAR; INTRAVENOUS at 22:40

## 2023-07-31 RX ADMIN — HYDRALAZINE HYDROCHLORIDE 10 MG: 20 INJECTION INTRAMUSCULAR; INTRAVENOUS at 03:30

## 2023-07-31 RX ADMIN — HYDRALAZINE HYDROCHLORIDE 10 MG: 20 INJECTION INTRAMUSCULAR; INTRAVENOUS at 10:10

## 2023-07-31 ASSESSMENT — PAIN DESCRIPTION - PAIN TYPE: TYPE: ACUTE PAIN

## 2023-07-31 ASSESSMENT — PAIN DESCRIPTION - ORIENTATION: ORIENTATION: LEFT

## 2023-07-31 ASSESSMENT — PAIN DESCRIPTION - LOCATION: LOCATION: FOOT

## 2023-07-31 ASSESSMENT — PAIN DESCRIPTION - ONSET: ONSET: ON-GOING

## 2023-07-31 ASSESSMENT — PAIN SCALES - GENERAL
PAINLEVEL_OUTOF10: 0
PAINLEVEL_OUTOF10: 0
PAINLEVEL_OUTOF10: 3

## 2023-07-31 ASSESSMENT — PAIN - FUNCTIONAL ASSESSMENT: PAIN_FUNCTIONAL_ASSESSMENT: ACTIVITIES ARE NOT PREVENTED

## 2023-07-31 ASSESSMENT — PAIN DESCRIPTION - FREQUENCY: FREQUENCY: CONTINUOUS

## 2023-07-31 ASSESSMENT — PAIN DESCRIPTION - DESCRIPTORS: DESCRIPTORS: ACHING

## 2023-07-31 NOTE — PROGRESS NOTES
Nephrology Consult Note                                                                                                                                                                                                                                                                                                                                                               Office : [phone]     Fax :[phone]      Patient's Name: Thomas Gillis  7:47 AM  7/31/2023      Assessment/Plan   CKD 3   Stable GFR  On RAASi   No other meds for now     2. HTN uncontrolled   No orthostatism: /62--> 178/62--> 180 (per nurse, lying--> sitting--. Standing)  Plan to increase Amlodipine to 10 mg daily  Monitor BP and leg edema   On discharge can resume diuretics if BP controlled     3. Anemia  Multifactorial   Iron sat low, no ferritin results- I will order that   Currently on Abx, avoid BAR    4. Acid- base/ Electrolyte imbalance   Resolved    5. Left leg swelling and pain, probable Cellulitis  On Abx  Tx per primaru/ podiatry     6. Fall   No orthostatism  Per primary team     Plan   best option to increase Amlodipine to 10 mg daily and monitor for side effects 9mainly edema)  - GFR stable   - On abx for cellulitis   - ARBEN - pending   - PT/OT      Recommend to dose adjust all medications  based on renal functions  Maintain SBP> 90 mmHg   Daily weights   AVOID NSAIDs  Avoid Nephrotoxins  Monitor Intake/Output  Call if significant decrease in urine output     Reason for Consult:  HTN   Requesting Physician:  Bert Chin MD      Chief Complaint:  Fall      History of Present Ilness:    81 y/o male came with fall   He also has cellulitis on Abx :Vancomycin, Ceftriaxone   Cr 1.7   GFR stable     BP elevated 173/76   On Amlodipine 5 mg,Lisinopril 5 mg bid   Urine + WBC, protein 100, RBC  Na , K BC wnl , Cr stable 1.7 GF4 34-39   cCa 8.5  CBC- Hb 8.2 ferritin?  Iron sat 6%  No dizziness   No SOB     Pt has

## 2023-07-31 NOTE — PROGRESS NOTES
Occupational Therapy  Facility/Department: [facility]  Occupational Therapy Treatment    Name: Rosaura Posey  : 10/5/1926  MRN: [id number]  Date of Service: 2023    Discharge Recommendations:   Rosaura Posey scored a 18/24 on the AM-PAC ADL Inpatient form. Current research shows that an AM-PAC score of 18 or greater is typically associated with a discharge to the patient's home setting. Based on the patient's AM-PAC score, and their current ADL deficits, it is recommended that the patient have 2-3 sessions per week of Occupational Therapy at d/c to increase the patient's independence. At this time, this patient demonstrates the endurance and safety to discharge  home with home services and a follow up treatment frequency of 2-3x/wk. Please see assessment section for further patient specific details. If patient discharges prior to next session this note will serve as a discharge summary. Please see below for the latest assessment towards goals. OT Equipment Recommendations  Equipment Needed: No       Patient Diagnosis(es): The encounter diagnosis was Cellulitis of left lower extremity. 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 Past Surgical History:  has a past surgical history that includes Total hip arthroplasty (Right); Total knee arthroplasty (Right); ORIF femur fracture (Right); and Colonoscopy.     Treatment Diagnosis: decreased ADLs and transfers secondary to cellulitis      Assessment   Performance deficits / Impairments: Decreased functional mobility ; Decreased endurance;Decreased coordination;Decreased ADL status; Decreased balance;Decreased strength;Decreased high-level IADLs  Assessment: Pt is pleasnt and cooperative. Pt req CG- SBA for most mobility, but req min assist for toilet transfer. Pt stood at sink for 16 min for grooming/hygiene and req CG-SBA. Tripp Antony Pt plans to dc home to independent living with increased assist from aide services. Feel pt would also benefit from [facility]. Continue OT per plan of care. Treatment Diagnosis: decreased ADLs and transfers secondary to cellulitis  Prognosis: Good  REQUIRES OT FOLLOW-UP: Yes  Activity Tolerance  Activity Tolerance: Patient Tolerated treatment well        Plan   Occupational Therapy Plan  Times Per Week: 2-5  Current Treatment Recommendations: Coordination training, Self-Care / ADL, Safety education & training, Endurance training, Patient/Caregiver education & training, Balance training, Functional mobility training     Restrictions  Position Activity Restriction  Other position/activity restrictions: Up as tolerated. Weightbearing as tolerated to b/l LE with normal shoe gear or post op shoe    Subjective   General  Chart Reviewed: Yes  Additional Pertinent Hx: Pt admitted to ED with c/o R LE redness and swelling. Diagnosed with cellulitis. PMHx includes: hypertension, CKD 4, seizure disorder, dementia. Family / Caregiver Present: Yes (wife, daughter)  Referring Practitioner: Sherine Mccurdy MD  Diagnosis: cellulitis of L LE  Subjective  Subjective: Pt up in chair upon entry. Pt agreeable to activity.   Pain: 0/10  denies pain  Social/Functional History  Social/Functional History  Type of Home: Facility  Home Layout: One level  Home Access: Level entry  Bathroom Shower/Tub: Walk-in shower  Bathroom Toilet: Handicap height  Bathroom Equipment: Grab bars in shower, Grab bars around toilet, Shower chair  Home Equipment: Rollator, Alert Button  Has the patient had two or more falls in the past

## 2023-07-31 NOTE — CARE COORDINATION
12:17 PM  Patient and family expressed interest in [facility]. Referral sent to Leila Solomon LPN for [facility] assistance.      Electronically signed by Madisyn Khan RN, CM on 7/31/2023 at 12:18 PM.  Phone: [phone]  Fax: [phone]

## 2023-07-31 NOTE — PLAN OF CARE
Problem: Pain  Goal: Verbalizes/displays adequate comfort level or baseline comfort level  Outcome: Progressing  Note: Pt reported pain level of a 3 on a scale of 1-10. Pt medicated w/ prn tylenol. Pt reported relief. Problem: Safety - Adult  Goal: Free from fall injury  Outcome: Progressing  Note: Patient free from falls/ injury during duration of shift. Bed alarm on, call light w/in reach, bed in lowest position, non-skid socks on and fall sign posted outside door.

## 2023-07-31 NOTE — PROGRESS NOTES
Orthostatic bp and pulse completed. lying 175/62, HR 73. sitting 178/62, hr 85 and standing 180/74, hr 74. [facility]. Prn hydralazine given and will reassess.      Manda Augustin RN

## 2023-07-31 NOTE — PROGRESS NOTES
Physical Therapy  Facility/Department: [facility]  Daily Treatment Note  NAME: Mike Fairchild  : 10/5/1926  MRN: [id number]    Date of Service: 2023    Discharge Recommendations: Mike Fairchild scored a 18/24 on the AM-PAC short mobility form. Current research shows that an AM-PAC score of 18 or greater is typically associated with a discharge to the patient's home setting. Based on the patient's AM-PAC score and their current functional mobility deficits, it is recommended that the patient have 2-3 sessions per week of Physical Therapy at d/c to increase the patient's independence. At this time, this patient demonstrates the endurance and safety to discharge home with home PT and a follow up treatment frequency of 2-3x/wk. Please see assessment section for further patient specific details. If patient discharges prior to next session this note will serve as a discharge summary. Please see below for the latest assessment towards goals. PT Equipment Recommendations  Equipment Needed: No  Other: pt owns rollator    Patient Diagnosis(es): The encounter diagnosis was Cellulitis of left lower extremity. Assessment   Assessment: pt tolerated session well increasing ambulation distances and standing tolerance. pt performing mobility at [facility] with CGA progressing to SBA with occasional cues for safety. pt requiring rest breaks 2/2 decreased endurance and demonstrates occasional sway in stance requiring CGA- SBA for standing balance. pt and family express no concerns for returning home, recommend initial inc assist and HHPT. PT to f/u  Activity Tolerance: Patient tolerated treatment well;Patient limited by endurance  Equipment Needed: No  Other: pt owns rollator     Plan    Physcial Therapy Plan  General Plan:  (2-5x/week)  Current Treatment Recommendations: Strengthening;Balance training;Functional mobility training;Transfer training;Gait training; Endurance training; Therapeutic activities; Safety Patient; Family  Education Provided: Role of Therapy;Plan of Care  Education Method: Verbal  Barriers to Learning: None  Education Outcome: Verbalized understanding    Therapy Time   Individual Concurrent Group Co-treatment   Time In 0911         Time Out 1004         Minutes [address],

## 2023-08-01 VITALS
OXYGEN SATURATION: 95 % | RESPIRATION RATE: 16 BRPM | HEIGHT: 72 IN | HEART RATE: 62 BPM | WEIGHT: 165.34 LBS | TEMPERATURE: 97.8 F | SYSTOLIC BLOOD PRESSURE: 138 MMHG | DIASTOLIC BLOOD PRESSURE: 84 MMHG | BODY MASS INDEX: 22.4 KG/M2

## 2023-08-01 LAB
ALBUMIN SERPL-MCNC: 2.5 G/DL (ref 3.4–5)
ANION GAP SERPL CALCULATED.3IONS-SCNC: 7 MMOL/L (ref 3–16)
BACTERIA BLD CULT ORG #2: NORMAL
BACTERIA BLD CULT: NORMAL
BUN SERPL-MCNC: 49 MG/DL (ref 7–20)
CALCIUM SERPL-MCNC: 8 MG/DL (ref 8.3–10.6)
CHLORIDE SERPL-SCNC: 110 MMOL/L (ref 99–110)
CO2 SERPL-SCNC: 24 MMOL/L (ref 21–32)
CREAT SERPL-MCNC: 1.3 MG/DL (ref 0.8–1.3)
GFR SERPLBLD CREATININE-BSD FMLA CKD-EPI: 50 ML/MIN/{1.73_M2}
GLUCOSE SERPL-MCNC: 116 MG/DL (ref 70–99)
PHOSPHATE SERPL-MCNC: 3.1 MG/DL (ref 2.5–4.9)
POTASSIUM SERPL-SCNC: 4.4 MMOL/L (ref 3.5–5.1)
SODIUM SERPL-SCNC: 141 MMOL/L (ref 136–145)

## 2023-08-01 PROCEDURE — 6370000000 HC RX 637 (ALT 250 FOR IP): Performed by: INTERNAL MEDICINE

## 2023-08-01 PROCEDURE — 36415 COLL VENOUS BLD VENIPUNCTURE: CPT

## 2023-08-01 PROCEDURE — 99232 SBSQ HOSP IP/OBS MODERATE 35: CPT | Performed by: INTERNAL MEDICINE

## 2023-08-01 PROCEDURE — 6360000002 HC RX W HCPCS: Performed by: INTERNAL MEDICINE

## 2023-08-01 PROCEDURE — 6370000000 HC RX 637 (ALT 250 FOR IP): Performed by: NURSE PRACTITIONER

## 2023-08-01 PROCEDURE — 80069 RENAL FUNCTION PANEL: CPT

## 2023-08-01 RX ORDER — CEFUROXIME AXETIL 250 MG/1
250 TABLET ORAL 2 TIMES DAILY
Qty: 14 TABLET | Refills: 0 | Status: SHIPPED | OUTPATIENT
Start: 2023-08-01 | End: 2023-08-08

## 2023-08-01 RX ORDER — HYDRALAZINE HYDROCHLORIDE 25 MG/1
25 TABLET, FILM COATED ORAL EVERY 8 HOURS SCHEDULED
Status: DISCONTINUED | OUTPATIENT
Start: 2023-08-01 | End: 2023-08-01 | Stop reason: HOSPADM

## 2023-08-01 RX ORDER — HYDRALAZINE HYDROCHLORIDE 25 MG/1
25 TABLET, FILM COATED ORAL EVERY 8 HOURS SCHEDULED
Qty: 90 TABLET | Refills: 3 | Status: SHIPPED | OUTPATIENT
Start: 2023-08-01

## 2023-08-01 RX ORDER — AMLODIPINE BESYLATE 10 MG/1
10 TABLET ORAL DAILY
Qty: 30 TABLET | Refills: 3 | Status: SHIPPED | OUTPATIENT
Start: 2023-08-01

## 2023-08-01 RX ADMIN — ASPIRIN 81 MG 81 MG: 81 TABLET ORAL at 09:35

## 2023-08-01 RX ADMIN — LISINOPRIL 5 MG: 5 TABLET ORAL at 09:35

## 2023-08-01 RX ADMIN — HYDRALAZINE HYDROCHLORIDE 25 MG: 25 TABLET, FILM COATED ORAL at 06:16

## 2023-08-01 RX ADMIN — AMLODIPINE BESYLATE 10 MG: 10 TABLET ORAL at 09:35

## 2023-08-01 RX ADMIN — Medication 100 MG: at 09:35

## 2023-08-01 RX ADMIN — LEVETIRACETAM 250 MG: 500 TABLET, FILM COATED ORAL at 09:35

## 2023-08-01 ASSESSMENT — PAIN SCALES - GENERAL
PAINLEVEL_OUTOF10: 0
PAINLEVEL_OUTOF10: 0

## 2023-08-01 NOTE — PROGRESS NOTES
Pt verbalized understanding of D/C instructions and prescription medications. Pt transferred to hospital entrance via wheelchair. Pt and belongings picked up by daughter via personal vehicle to be transported to [facility]. Report called to facility.

## 2023-08-01 NOTE — PROGRESS NOTES
Pt BP elevated, IV access lost, pt plans to d/c today. Messaged provider for possible PO hydralazine.  PO ordered for 0600

## 2023-08-01 NOTE — CARE COORDINATION
Case Management Assessment            Discharge Note                    Date / Time of Note: 8/1/2023 10:36 AM                  Discharge Note Completed by: Davian Madera RN    Patient Name: Julianne Santana   YOB: 1926  Diagnosis: Cellulitis of left lower extremity [L03.116]  Cellulitis [L03.90]   Date / Time: 7/27/2023  5:08 PM    Current PCP: Raj Jordan MD  Clinic patient: No    Hospitalization in the last 30 days: No    Advance Directives:  Code Status: Full Code  [place] DNR form completed and on chart: Not Indicated    Financial:  Payor: MEDICARE / Plan: MEDICARE PART A AND B / Product Type: *No Product type* /      Pharmacy:    Nancie Jalloh, [facility] [address] [place] [address] [phone]  [facility] [address]  Phone: [phone] Fax: [phone]      Assistance purchasing medications?: Potential Assistance Purchasing Medications: No  Assistance provided by Case Management: None at this time    Does patient want to participate in local refill/ meds to beds program?: Not Assessed    Meds To Beds General Rules:  1. Can ONLY be done Monday- Friday between 8:30am-5pm  2. Prescription(s) must be in pharmacy by 3pm to be filled same day  3. Copy of patient's insurance/ prescription drug card and patient face sheet must be sent along with the prescription(s)  4. Cost of Rx cannot be added to hospital bill. If financial assistance is needed, please contact unit  or ;  or  CANNOT provide pharmacy voucher for patients co-pays  5.  Patients can then  the prescription on their way out of the hospital at discharge, or pharmacy can deliver to the bedside if staff is available. (payment due at time of pick-up or delivery - cash, check, or card accepted)     Able to afford home medications/ co-pay costs: Yes    ADLS:  Current PT AM-PAC Score: 18 /24  Current OT AM-PAC Score: 18

## 2023-08-01 NOTE — PROGRESS NOTES
Dc orders placed  D/w Dr Parker Randolph  D/w daughter, pt and case management    BETTYE done    Electronically signed by Mally West MD on 8/1/2023 at 9:20 AM

## 2023-08-01 NOTE — PLAN OF CARE
Problem: Discharge Planning  Goal: Discharge to home or other facility with appropriate resources  8/1/2023 0748 by Katy Mahmood RN  Outcome: Progressing     Problem: Pain  Goal: Verbalizes/displays adequate comfort level or baseline comfort level  8/1/2023 0748 by Katy Mahmood RN  Outcome: Progressing     Problem: Chronic Conditions and Co-morbidities  Goal: Patient's chronic conditions and co-morbidity symptoms are monitored and maintained or improved  8/1/2023 0748 by Katy Mahmood RN  Outcome: Progressing     Problem: Safety - Adult  Goal: Free from fall injury  8/1/2023 0748 by Katy Mahmood RN  Outcome: Progressing     Problem: Skin/Tissue Integrity  Goal: Absence of new skin breakdown  Description: 1. Monitor for areas of redness and/or skin breakdown  2. Assess vascular access sites hourly  3. Every 4-6 hours minimum:  Change oxygen saturation probe site  4. Every 4-6 hours:  If on nasal continuous positive airway pressure, respiratory therapy assess nares and determine need for appliance change or resting period.   8/1/2023 0748 by Katy Mahmood RN  Outcome: Progressing     Problem: ABCDS Injury Assessment  Goal: Absence of physical injury  8/1/2023 0748 by Katy Mahmood RN  Outcome: Progressing

## 2023-08-01 NOTE — PLAN OF CARE
Problem: Discharge Planning  Goal: Discharge to home or other facility with appropriate resources  Outcome: Progressing     Problem: Pain  Goal: Verbalizes/displays adequate comfort level or baseline comfort level  7/31/2023 2140 by Bonita Choi RN  Outcome: Progressing  7/31/2023 1432 by Antoni Hernandez  Outcome: Progressing  Note: Pt reported pain level of a 3 on a scale of 1-10. Pt medicated w/ prn tylenol. Pt reported relief. Problem: Chronic Conditions and Co-morbidities  Goal: Patient's chronic conditions and co-morbidity symptoms are monitored and maintained or improved  Outcome: Progressing     Problem: Safety - Adult  Goal: Free from fall injury  7/31/2023 2140 by Bonita Choi RN  Outcome: Progressing  7/31/2023 1432 by Antoni Hernandez  Outcome: Progressing  Note: Patient free from falls/ injury during duration of shift. Bed alarm on, call light w/in reach, bed in lowest position, non-skid socks on and fall sign posted outside door. Problem: Skin/Tissue Integrity  Goal: Absence of new skin breakdown  Description: 1. Monitor for areas of redness and/or skin breakdown  2. Assess vascular access sites hourly  3. Every 4-6 hours minimum:  Change oxygen saturation probe site  4. Every 4-6 hours:  If on nasal continuous positive airway pressure, respiratory therapy assess nares and determine need for appliance change or resting period.   Outcome: Progressing     Problem: ABCDS Injury Assessment  Goal: Absence of physical injury  Outcome: Progressing     Problem: Nutrition Deficit:  Goal: Optimize nutritional status  Outcome: Progressing

## 2023-08-01 NOTE — CARE COORDINATION
[facility]    Patient aware and agreeable to services.  Faxed orders to [facility] for [facility] by 8/3    Amelia Okeefe LPN  Care Transition Nurse  [facility]  [phone]

## 2023-08-01 NOTE — PROGRESS NOTES
Nephrology Consult Note                                                                                                                                                                                                                                                                                                                                                               Office : [phone]     Fax :[phone]      Patient's Name: Allen Perez  8:08 AM  8/1/2023      Assessment/Plan   CKD 3   Stable GFR 50  On RAASi   No other meds for now     2. HTN uncontrolled   No orthostatism yesterday  -180s/70-80s  Amlodipine increased to 10 mg daily- first dose today   Monitor BP and leg edema   Monitor BP as an outpatient. If not controlled- resume oral Lasix 20 mg and up titrate as needed. 3. Anemia  Multifactorial   Iron sat low, no ferritin results- I will order that   Currently on Abx, avoid BAR    4. Acid- base/ Electrolyte imbalance   Resolved    5. Left leg swelling and pain, probable Cellulitis  On Abx  Tx per [facility]/ podiatry     6. Fall   No orthostatism  Per primary team     Plan   best option to increase Amlodipine to 10 mg daily and monitor for side effects 9mainly edema)  - GFR stable   - On abx for cellulitis   - ARBEN - pending   - PT/OT  - monitor BP   - resume lasix 20 mg     Recommend to dose adjust all medications  based on renal functions  Maintain SBP> 90 mmHg   Daily weights   AVOID NSAIDs  Avoid Nephrotoxins  Monitor Intake/Output  Call if significant decrease in urine output     Reason for Consult:  HTN   Requesting Physician:  Dinorah Hatfield MD      Chief Complaint:  Fall      History of Present Ilness:    79 y/o male came with fall   He also has cellulitis on Abx :Vancomycin, Ceftriaxone   Cr on admission 1.7--> peaked 2.1 now 1.3   Pt has dementia     Interval hx   Pt feels well. Pt denies CP/SOB/palpitations/abdominal pain/N/V. No dysuria.    BP elevated   On Amlodipine 10<--5 mg,Lisinopril 5 mg bid   Urine + WBC, protein 100, RBC  Na , K Bicarb wnl , Cr stable 1.3 GFR 50  cCa 8.4  CBC- Hb 8.7 ferritin 206 Iron sat 6%  No dizziness   No SOB       Past Medical History:   Diagnosis Date    Ambulatory dysfunction     BPH (benign prostatic hyperplasia)     CKD (chronic kidney disease) stage 4, GFR 15-29 ml/min ([facility])     CVA (cerebral vascular accident) ([address])     Diastolic heart failure ([facility])     GERD (gastroesophageal reflux disease)     Glaucoma     Hypercholesterolemia     Hypertension     Macular degeneration     Osteoarthritis of multiple joints     Parkinson's disease ([address])     Peptic ulcer disease     Seizures ([address])     Senile dementia of Alzheimer's type ([address])        Past Surgical History:   Procedure Laterality Date    COLONOSCOPY      ORIF FEMUR FRACTURE Right     TOTAL HIP ARTHROPLASTY Right     TOTAL KNEE ARTHROPLASTY Right        Family History   Problem Relation Age of Onset    Heart Disease Father         reports that he has quit smoking. His smoking use included cigarettes. He has never used smokeless tobacco. He reports current alcohol use of about 3.0 standard drinks per week. Allergies:  Patient has no known allergies.     Current Medications:    hydrALAZINE (APRESOLINE) tablet 25 mg, 3 times per day  amLODIPine (NORVASC) tablet 10 mg, Daily  cefTRIAXone (ROCEPHIN) 1,000 mg in sodium chloride 0.9 % 50 mL IVPB (mini-bag), Q24H  thiamine mononitrate tablet 100 mg, Daily  melatonin tablet 3 mg, Nightly  sodium chloride flush 0.9 % injection 5-40 mL, 2 times per day  sodium chloride flush 0.9 % injection 5-40 mL, PRN  0.9 % sodium chloride infusion, PRN  ondansetron (ZOFRAN-ODT) disintegrating tablet 4 mg, Q8H PRN   Or  ondansetron (ZOFRAN) injection 4 mg, Q6H PRN  polyethylene glycol (GLYCOLAX) packet 17 g, Daily PRN  acetaminophen (TYLENOL) tablet 650 mg, Q6H PRN   Or  acetaminophen (TYLENOL) suppository 650 mg, Q6H PRN  aspirin chewable tablet 81 mg,

## 2023-08-01 NOTE — CARE COORDINATION
8:46 AM  Call received from [facility] regarding SNF admission. Per RN, family would prefer SNF dc prior to returning back to [place]. LVM for Olena at [facility].       Electronically signed by Luther Joyce RN, CM on 8/1/2023 at 8:47 AM.  Phone: [phone]  Fax: [phone]

## 2023-08-07 NOTE — PROGRESS NOTES
Physician Progress Note      PATIENT:               Tom Speaker  CSN #:                  [id number]  :                       10/5/1926  ADMIT DATE:       2023 5:08 PM  [address] [place] [address] DATE:        2023 2:26 PM  RESPONDING  PROVIDER #:        Ritika Le MD          QUERY TEXT:    Patient admitted with cellulitis of LLE. Per Wound Care consult on 23,   noted to also have pressure ulcer. If possible, please document in progress   notes and discharge summary the location, present on admission status and   stage of the pressure ulcer: The medical record reflects the following:  Risk Factors: Age, HX-  hypertension, CKD 4, seizure disorder, dementia, CVA,   Ambulatory dysfunction  Clinical Indicators: Per Wound Care consult on 23attending pn on 23-   Coccyx - Stage 3. ...... Valaria Daft Per dietician consult on 23- PI st 3 on coccyx. Treatment: Clean with NS, cover wound bed with a piece of alginate ag (Aquacel   Advantage), foam dressing, change daily. Remind pt to turn every 2 hours    Stage 1:  Non-blanchable erythema of intact skin  Stage 2:  Abrasion, Blister, Partial-thickness skin loss, with exposed dermis  Stage 3:  Full-thickness skin loss with damage or necrosis of subcutaneous   tissue  Stage 4:  Full-thickness skin & soft tissue loss through to underlying muscle,   tendon or bone  Unstageable: Obscured full-thickness skin & tissue loss    Thank You,  Margot Hannah RN BSN CDS CRCR  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     Query created by: Margot Hannah on 2023 7:09 AM      QUERY